# Patient Record
Sex: MALE | Race: WHITE | NOT HISPANIC OR LATINO
[De-identification: names, ages, dates, MRNs, and addresses within clinical notes are randomized per-mention and may not be internally consistent; named-entity substitution may affect disease eponyms.]

---

## 2017-01-10 ENCOUNTER — APPOINTMENT (OUTPATIENT)
Dept: NEPHROLOGY | Facility: CLINIC | Age: 67
End: 2017-01-10

## 2017-01-10 VITALS
HEIGHT: 78 IN | DIASTOLIC BLOOD PRESSURE: 75 MMHG | WEIGHT: 227.07 LBS | HEART RATE: 77 BPM | OXYGEN SATURATION: 98 % | SYSTOLIC BLOOD PRESSURE: 115 MMHG | BODY MASS INDEX: 26.27 KG/M2

## 2017-01-10 VITALS — DIASTOLIC BLOOD PRESSURE: 72 MMHG | SYSTOLIC BLOOD PRESSURE: 120 MMHG

## 2017-01-19 LAB
APPEARANCE: CLEAR
BACTERIA: NEGATIVE
BILIRUBIN URINE: NEGATIVE
BLOOD URINE: NEGATIVE
CALCIUM ?TM UR-MCNC: 6 MG/DL
CALCIUM/CREAT UR: 0.1 RATIO
COLOR: ABNORMAL
CREAT SPEC-SCNC: 83 MG/DL
CREAT SPEC-SCNC: 84 MG/DL
CREAT/PROT UR: 0.1 RATIO
GLUCOSE QUALITATIVE U: NORMAL MG/DL
HYALINE CASTS: 1 /LPF
KETONES URINE: NEGATIVE
LEUKOCYTE ESTERASE URINE: NEGATIVE
MICROSCOPIC-UA: NORMAL
NITRITE URINE: NEGATIVE
OSMOLALITY UR: 493 MOS/KG
PH URINE: 6
PROT UR-MCNC: 5 MG/DL
PROTEIN URINE: NEGATIVE MG/DL
RED BLOOD CELLS URINE: 3 /HPF
SODIUM ?TM SUB UR QN: 94 MMOL/L
SPECIFIC GRAVITY URINE: 1.02
SQUAMOUS EPITHELIAL CELLS: 0 /HPF
UROBILINOGEN URINE: NORMAL MG/DL
WHITE BLOOD CELLS URINE: 1 /HPF

## 2017-01-23 LAB — CYSTINE UR-MCNC: ABNORMAL

## 2017-02-10 ENCOUNTER — RX RENEWAL (OUTPATIENT)
Age: 67
End: 2017-02-10

## 2017-08-17 ENCOUNTER — APPOINTMENT (OUTPATIENT)
Dept: CARDIOLOGY | Facility: CLINIC | Age: 67
End: 2017-08-17
Payer: MEDICARE

## 2017-08-17 ENCOUNTER — NON-APPOINTMENT (OUTPATIENT)
Age: 67
End: 2017-08-17

## 2017-08-17 VITALS — HEART RATE: 72 BPM | DIASTOLIC BLOOD PRESSURE: 78 MMHG | SYSTOLIC BLOOD PRESSURE: 126 MMHG

## 2017-08-17 VITALS
HEIGHT: 78 IN | RESPIRATION RATE: 15 BRPM | WEIGHT: 225 LBS | DIASTOLIC BLOOD PRESSURE: 80 MMHG | HEART RATE: 65 BPM | SYSTOLIC BLOOD PRESSURE: 143 MMHG | OXYGEN SATURATION: 99 % | BODY MASS INDEX: 26.03 KG/M2

## 2017-08-17 DIAGNOSIS — R60.0 LOCALIZED EDEMA: ICD-10-CM

## 2017-08-17 PROCEDURE — 93306 TTE W/DOPPLER COMPLETE: CPT

## 2017-08-17 PROCEDURE — 93000 ELECTROCARDIOGRAM COMPLETE: CPT

## 2017-08-17 PROCEDURE — 99204 OFFICE O/P NEW MOD 45 MIN: CPT

## 2017-08-30 ENCOUNTER — APPOINTMENT (OUTPATIENT)
Dept: CARDIOLOGY | Facility: CLINIC | Age: 67
End: 2017-08-30
Payer: MEDICARE

## 2017-08-30 PROCEDURE — 93015 CV STRESS TEST SUPVJ I&R: CPT

## 2017-09-19 ENCOUNTER — FORM ENCOUNTER (OUTPATIENT)
Age: 67
End: 2017-09-19

## 2017-09-20 ENCOUNTER — OUTPATIENT (OUTPATIENT)
Dept: OUTPATIENT SERVICES | Facility: HOSPITAL | Age: 67
LOS: 1 days | End: 2017-09-20
Payer: MEDICARE

## 2017-09-20 ENCOUNTER — APPOINTMENT (OUTPATIENT)
Dept: CARDIOLOGY | Facility: CLINIC | Age: 67
End: 2017-09-20

## 2017-09-20 DIAGNOSIS — R07.9 CHEST PAIN, UNSPECIFIED: ICD-10-CM

## 2017-09-20 DIAGNOSIS — R94.39 ABNORMAL RESULT OF OTHER CARDIOVASCULAR FUNCTION STUDY: ICD-10-CM

## 2017-09-20 DIAGNOSIS — R06.00 DYSPNEA, UNSPECIFIED: ICD-10-CM

## 2017-09-20 PROCEDURE — 75574 CT ANGIO HRT W/3D IMAGE: CPT

## 2017-09-20 PROCEDURE — 75574 CT ANGIO HRT W/3D IMAGE: CPT | Mod: 26

## 2017-10-18 ENCOUNTER — APPOINTMENT (OUTPATIENT)
Dept: NEPHROLOGY | Facility: CLINIC | Age: 67
End: 2017-10-18
Payer: MEDICARE

## 2017-10-18 VITALS
OXYGEN SATURATION: 97 % | WEIGHT: 231.48 LBS | SYSTOLIC BLOOD PRESSURE: 128 MMHG | BODY MASS INDEX: 26.78 KG/M2 | HEART RATE: 79 BPM | HEIGHT: 78 IN | DIASTOLIC BLOOD PRESSURE: 77 MMHG

## 2017-10-18 VITALS — DIASTOLIC BLOOD PRESSURE: 76 MMHG | SYSTOLIC BLOOD PRESSURE: 128 MMHG

## 2017-10-18 PROCEDURE — 99214 OFFICE O/P EST MOD 30 MIN: CPT

## 2018-06-05 ENCOUNTER — APPOINTMENT (OUTPATIENT)
Dept: NEPHROLOGY | Facility: CLINIC | Age: 68
End: 2018-06-05
Payer: MEDICARE

## 2018-06-05 VITALS
HEART RATE: 100 BPM | OXYGEN SATURATION: 95 % | DIASTOLIC BLOOD PRESSURE: 81 MMHG | BODY MASS INDEX: 26.91 KG/M2 | SYSTOLIC BLOOD PRESSURE: 132 MMHG | WEIGHT: 232.58 LBS | HEIGHT: 78 IN

## 2018-06-05 VITALS — DIASTOLIC BLOOD PRESSURE: 78 MMHG | SYSTOLIC BLOOD PRESSURE: 130 MMHG

## 2018-06-05 PROCEDURE — 99214 OFFICE O/P EST MOD 30 MIN: CPT

## 2018-06-06 LAB
APPEARANCE: CLEAR
BACTERIA: NEGATIVE
BILIRUBIN URINE: NEGATIVE
BLOOD URINE: NEGATIVE
CALCIUM ?TM UR-MCNC: 19 MG/DL
CALCIUM/CREAT UR: 0.1 RATIO
COLOR: ABNORMAL
CREAT SPEC-SCNC: 141 MG/DL
CREAT/PROT UR: 0.1 RATIO
GLUCOSE QUALITATIVE U: NEGATIVE MG/DL
HYALINE CASTS: 0 /LPF
KETONES URINE: NEGATIVE
LEUKOCYTE ESTERASE URINE: NEGATIVE
MICROALBUMIN 24H UR DL<=1MG/L-MCNC: 1.2 MG/DL
MICROALBUMIN/CREAT 24H UR-RTO: 9 MG/G
MICROSCOPIC-UA: NORMAL
NITRITE URINE: NEGATIVE
OSMOLALITY UR: 834 MOS/KG
PH URINE: 5.5
PROT UR-MCNC: 10 MG/DL
PROTEIN URINE: NEGATIVE MG/DL
RED BLOOD CELLS URINE: 4 /HPF
SODIUM ?TM SUB UR QN: 172 MMOL/L
SPECIFIC GRAVITY URINE: 1.02
SQUAMOUS EPITHELIAL CELLS: 1 /HPF
UROBILINOGEN URINE: NEGATIVE MG/DL
WHITE BLOOD CELLS URINE: 1 /HPF

## 2018-06-07 LAB — BACTERIA UR CULT: NORMAL

## 2018-06-12 ENCOUNTER — OUTPATIENT (OUTPATIENT)
Dept: OUTPATIENT SERVICES | Facility: HOSPITAL | Age: 68
LOS: 1 days | End: 2018-06-12
Payer: MEDICARE

## 2018-06-12 ENCOUNTER — APPOINTMENT (OUTPATIENT)
Dept: RADIOLOGY | Facility: HOSPITAL | Age: 68
End: 2018-06-12
Payer: MEDICARE

## 2018-06-12 DIAGNOSIS — Z00.8 ENCOUNTER FOR OTHER GENERAL EXAMINATION: ICD-10-CM

## 2018-06-12 PROCEDURE — 74018 RADEX ABDOMEN 1 VIEW: CPT

## 2018-06-12 PROCEDURE — 74018 RADEX ABDOMEN 1 VIEW: CPT | Mod: 26

## 2018-06-14 ENCOUNTER — EMERGENCY (EMERGENCY)
Facility: HOSPITAL | Age: 68
LOS: 1 days | Discharge: HOME | End: 2018-06-14
Admitting: EMERGENCY MEDICINE
Payer: MEDICARE

## 2018-06-14 PROCEDURE — 99284 EMERGENCY DEPT VISIT MOD MDM: CPT

## 2018-06-14 PROCEDURE — 74176 CT ABD & PELVIS W/O CONTRAST: CPT | Mod: 26

## 2018-06-15 PROCEDURE — 87086 URINE CULTURE/COLONY COUNT: CPT

## 2018-06-15 PROCEDURE — 80048 BASIC METABOLIC PNL TOTAL CA: CPT

## 2018-06-15 PROCEDURE — 85610 PROTHROMBIN TIME: CPT

## 2018-06-15 PROCEDURE — 85730 THROMBOPLASTIN TIME PARTIAL: CPT

## 2018-06-15 PROCEDURE — 81003 URINALYSIS AUTO W/O SCOPE: CPT

## 2018-06-15 PROCEDURE — 96375 TX/PRO/DX INJ NEW DRUG ADDON: CPT

## 2018-06-15 PROCEDURE — 74176 CT ABD & PELVIS W/O CONTRAST: CPT

## 2018-06-15 PROCEDURE — 96374 THER/PROPH/DIAG INJ IV PUSH: CPT

## 2018-06-15 PROCEDURE — 99284 EMERGENCY DEPT VISIT MOD MDM: CPT | Mod: 25

## 2018-06-15 PROCEDURE — 85027 COMPLETE CBC AUTOMATED: CPT

## 2018-06-18 ENCOUNTER — OUTPATIENT (OUTPATIENT)
Dept: OUTPATIENT SERVICES | Facility: HOSPITAL | Age: 68
LOS: 1 days | End: 2018-06-18
Payer: MEDICARE

## 2018-06-18 ENCOUNTER — APPOINTMENT (OUTPATIENT)
Dept: RADIOLOGY | Facility: HOSPITAL | Age: 68
End: 2018-06-18
Payer: MEDICARE

## 2018-06-18 DIAGNOSIS — Z00.8 ENCOUNTER FOR OTHER GENERAL EXAMINATION: ICD-10-CM

## 2018-06-18 LAB — CYSTINE UR-MCNC: ABNORMAL

## 2018-06-18 PROCEDURE — 74018 RADEX ABDOMEN 1 VIEW: CPT | Mod: 26

## 2018-06-18 PROCEDURE — 74018 RADEX ABDOMEN 1 VIEW: CPT

## 2018-06-26 ENCOUNTER — OUTPATIENT (OUTPATIENT)
Dept: OUTPATIENT SERVICES | Facility: HOSPITAL | Age: 68
LOS: 1 days | End: 2018-06-26
Payer: MEDICARE

## 2018-06-26 VITALS
HEART RATE: 80 BPM | HEIGHT: 76 IN | TEMPERATURE: 98 F | RESPIRATION RATE: 16 BRPM | SYSTOLIC BLOOD PRESSURE: 131 MMHG | DIASTOLIC BLOOD PRESSURE: 79 MMHG | WEIGHT: 225.75 LBS | OXYGEN SATURATION: 96 %

## 2018-06-26 VITALS
SYSTOLIC BLOOD PRESSURE: 121 MMHG | RESPIRATION RATE: 16 BRPM | TEMPERATURE: 98 F | DIASTOLIC BLOOD PRESSURE: 83 MMHG | HEIGHT: 76 IN | HEART RATE: 93 BPM | WEIGHT: 225.75 LBS

## 2018-06-26 DIAGNOSIS — Z98.890 OTHER SPECIFIED POSTPROCEDURAL STATES: Chronic | ICD-10-CM

## 2018-06-26 DIAGNOSIS — Z01.818 ENCOUNTER FOR OTHER PREPROCEDURAL EXAMINATION: ICD-10-CM

## 2018-06-26 DIAGNOSIS — N20.0 CALCULUS OF KIDNEY: ICD-10-CM

## 2018-06-26 DIAGNOSIS — N20.1 CALCULUS OF URETER: ICD-10-CM

## 2018-06-26 DIAGNOSIS — N21.0 CALCULUS IN BLADDER: ICD-10-CM

## 2018-06-26 DIAGNOSIS — Z90.89 ACQUIRED ABSENCE OF OTHER ORGANS: Chronic | ICD-10-CM

## 2018-06-26 LAB
ANION GAP SERPL CALC-SCNC: 9 MMOL/L — SIGNIFICANT CHANGE UP (ref 5–17)
BUN SERPL-MCNC: 15 MG/DL — SIGNIFICANT CHANGE UP (ref 7–23)
CALCIUM SERPL-MCNC: 9.3 MG/DL — SIGNIFICANT CHANGE UP (ref 8.4–10.5)
CHLORIDE SERPL-SCNC: 110 MMOL/L — HIGH (ref 96–108)
CO2 SERPL-SCNC: 26 MMOL/L — SIGNIFICANT CHANGE UP (ref 22–31)
CREAT SERPL-MCNC: 0.95 MG/DL — SIGNIFICANT CHANGE UP (ref 0.5–1.3)
GLUCOSE SERPL-MCNC: 116 MG/DL — HIGH (ref 70–99)
HCT VFR BLD CALC: 46.8 % — SIGNIFICANT CHANGE UP (ref 39–50)
HGB BLD-MCNC: 16.3 G/DL — SIGNIFICANT CHANGE UP (ref 13–17)
MCHC RBC-ENTMCNC: 32.8 PG — SIGNIFICANT CHANGE UP (ref 27–34)
MCHC RBC-ENTMCNC: 34.9 GM/DL — SIGNIFICANT CHANGE UP (ref 32–36)
MCV RBC AUTO: 94 FL — SIGNIFICANT CHANGE UP (ref 80–100)
PLATELET # BLD AUTO: 248 K/UL — SIGNIFICANT CHANGE UP (ref 150–400)
POTASSIUM SERPL-MCNC: 3.7 MMOL/L — SIGNIFICANT CHANGE UP (ref 3.5–5.3)
POTASSIUM SERPL-SCNC: 3.7 MMOL/L — SIGNIFICANT CHANGE UP (ref 3.5–5.3)
RBC # BLD: 4.98 M/UL — SIGNIFICANT CHANGE UP (ref 4.2–5.8)
RBC # FLD: 11.3 % — SIGNIFICANT CHANGE UP (ref 10.3–14.5)
SODIUM SERPL-SCNC: 145 MMOL/L — SIGNIFICANT CHANGE UP (ref 135–145)
WBC # BLD: 7.8 K/UL — SIGNIFICANT CHANGE UP (ref 3.8–10.5)
WBC # FLD AUTO: 7.8 K/UL — SIGNIFICANT CHANGE UP (ref 3.8–10.5)

## 2018-06-26 PROCEDURE — 93010 ELECTROCARDIOGRAM REPORT: CPT | Mod: NC

## 2018-06-26 PROCEDURE — G0463: CPT

## 2018-06-26 PROCEDURE — 36415 COLL VENOUS BLD VENIPUNCTURE: CPT

## 2018-06-26 PROCEDURE — 85027 COMPLETE CBC AUTOMATED: CPT

## 2018-06-26 PROCEDURE — 80048 BASIC METABOLIC PNL TOTAL CA: CPT

## 2018-06-26 PROCEDURE — 93005 ELECTROCARDIOGRAM TRACING: CPT

## 2018-06-26 RX ORDER — SODIUM CHLORIDE 9 MG/ML
1000 INJECTION, SOLUTION INTRAVENOUS
Qty: 0 | Refills: 0 | Status: DISCONTINUED | OUTPATIENT
Start: 2018-07-02 | End: 2018-07-02

## 2018-06-26 NOTE — H&P PST ADULT - FAMILY HISTORY
Mother  Still living? No  Family history of cirrhosis of liver, Age at diagnosis: Age Unknown  Family history of Parkinson disease, Age at diagnosis: Age Unknown

## 2018-06-26 NOTE — H&P PST ADULT - RS GEN PE MLT RESP DETAILS PC
respirations non-labored/breath sounds equal/airway patent/good air movement/no rales/no wheezes/no rhonchi/clear to auscultation bilaterally

## 2018-06-26 NOTE — H&P PST ADULT - HISTORY OF PRESENT ILLNESS
68 yo male presents s/p acute episode of right flank pain. Had an xray done which revealed 2 stones on the left but nothing on the right. He then was sent to the ER at [facility] and had an ultrasound done which showed stones on the right and a stone in the bladder. Currently denies pain and the past episode of pain was on 6/21/18

## 2018-06-26 NOTE — ASU PATIENT PROFILE, ADULT - PMH
BPH (benign prostatic hyperplasia)  no symptoms  Heart murmur    Lumbar disc herniation  unsure level  Osteoarthritis    Renal calculi  multiple episodes of bladder and kidney stones (cysteine stones)  Varicose veins  bilateral

## 2018-06-26 NOTE — H&P PST ADULT - PSH
H/O lithotripsy  x4  S/P colonoscopy  2018, no polyps  S/P cystoscopy  2015 bladder stone extraction  S/P T&A (status post tonsillectomy and adenoidectomy)  as child

## 2018-06-26 NOTE — H&P PST ADULT - PMH
BPH (benign prostatic hyperplasia)  no symptoms  Heart murmur    Lumbar disc herniation  unsure level  Osteoarthritis    Renal calculi  multiple episodes of bladder and kidney stones (cysteine stones) BPH (benign prostatic hyperplasia)  no symptoms  Heart murmur    Lumbar disc herniation  unsure level  Osteoarthritis    Renal calculi  multiple episodes of bladder and kidney stones (cysteine stones)  Varicose veins  bilateral

## 2018-06-26 NOTE — H&P PST ADULT - PROBLEM SELECTOR PLAN 1
cystoscopy, litholapaxy bladder stone, right ureteroscopy, laser litho, right stent placement. medical clearance requested. pantoprazole AM of surgery with sips of water

## 2018-06-28 ENCOUNTER — APPOINTMENT (OUTPATIENT)
Dept: CARDIOLOGY | Facility: CLINIC | Age: 68
End: 2018-06-28
Payer: MEDICARE

## 2018-06-28 ENCOUNTER — NON-APPOINTMENT (OUTPATIENT)
Age: 68
End: 2018-06-28

## 2018-06-28 VITALS
RESPIRATION RATE: 15 BRPM | HEART RATE: 75 BPM | OXYGEN SATURATION: 98 % | DIASTOLIC BLOOD PRESSURE: 80 MMHG | SYSTOLIC BLOOD PRESSURE: 127 MMHG | HEIGHT: 78 IN

## 2018-06-28 VITALS — DIASTOLIC BLOOD PRESSURE: 76 MMHG | HEART RATE: 80 BPM | SYSTOLIC BLOOD PRESSURE: 120 MMHG

## 2018-06-28 DIAGNOSIS — R94.39 ABNORMAL RESULT OF OTHER CARDIOVASCULAR FUNCTION STUDY: ICD-10-CM

## 2018-06-28 PROCEDURE — 93000 ELECTROCARDIOGRAM COMPLETE: CPT

## 2018-06-28 PROCEDURE — 99214 OFFICE O/P EST MOD 30 MIN: CPT

## 2018-06-28 RX ORDER — CIPROFLOXACIN HYDROCHLORIDE 500 MG/1
500 TABLET, FILM COATED ORAL
Qty: 20 | Refills: 0 | Status: DISCONTINUED | COMMUNITY
Start: 2018-06-14

## 2018-06-28 RX ORDER — PENICILLAMINE 250 MG/1
250 CAPSULE ORAL
Qty: 90 | Refills: 3 | Status: DISCONTINUED | COMMUNITY
Start: 2017-10-18 | End: 2018-06-28

## 2018-07-01 ENCOUNTER — TRANSCRIPTION ENCOUNTER (OUTPATIENT)
Age: 68
End: 2018-07-01

## 2018-07-02 ENCOUNTER — RESULT REVIEW (OUTPATIENT)
Age: 68
End: 2018-07-02

## 2018-07-02 ENCOUNTER — OUTPATIENT (OUTPATIENT)
Dept: OUTPATIENT SERVICES | Facility: HOSPITAL | Age: 68
LOS: 1 days | End: 2018-07-02
Payer: MEDICARE

## 2018-07-02 VITALS
SYSTOLIC BLOOD PRESSURE: 131 MMHG | DIASTOLIC BLOOD PRESSURE: 75 MMHG | HEART RATE: 87 BPM | TEMPERATURE: 98 F | OXYGEN SATURATION: 96 % | RESPIRATION RATE: 16 BRPM

## 2018-07-02 DIAGNOSIS — Z98.890 OTHER SPECIFIED POSTPROCEDURAL STATES: Chronic | ICD-10-CM

## 2018-07-02 DIAGNOSIS — N20.1 CALCULUS OF URETER: ICD-10-CM

## 2018-07-02 DIAGNOSIS — N21.0 CALCULUS IN BLADDER: ICD-10-CM

## 2018-07-02 DIAGNOSIS — Z90.89 ACQUIRED ABSENCE OF OTHER ORGANS: Chronic | ICD-10-CM

## 2018-07-02 PROCEDURE — 88300 SURGICAL PATH GROSS: CPT

## 2018-07-02 PROCEDURE — C2617: CPT

## 2018-07-02 PROCEDURE — C1769: CPT

## 2018-07-02 PROCEDURE — C1758: CPT

## 2018-07-02 PROCEDURE — 82365 CALCULUS SPECTROSCOPY: CPT

## 2018-07-02 PROCEDURE — 88300 SURGICAL PATH GROSS: CPT | Mod: 26

## 2018-07-02 PROCEDURE — 52356 CYSTO/URETERO W/LITHOTRIPSY: CPT | Mod: RT

## 2018-07-02 PROCEDURE — 52317 REMOVE BLADDER STONE: CPT | Mod: XS

## 2018-07-02 PROCEDURE — 76000 FLUOROSCOPY <1 HR PHYS/QHP: CPT

## 2018-07-02 PROCEDURE — C1889: CPT

## 2018-07-02 RX ORDER — ONDANSETRON 8 MG/1
4 TABLET, FILM COATED ORAL ONCE
Qty: 0 | Refills: 0 | Status: DISCONTINUED | OUTPATIENT
Start: 2018-07-02 | End: 2018-07-02

## 2018-07-02 RX ORDER — HYDROMORPHONE HYDROCHLORIDE 2 MG/ML
0.5 INJECTION INTRAMUSCULAR; INTRAVENOUS; SUBCUTANEOUS
Qty: 0 | Refills: 0 | Status: DISCONTINUED | OUTPATIENT
Start: 2018-07-02 | End: 2018-07-02

## 2018-07-02 RX ORDER — AZTREONAM 2 G
1 VIAL (EA) INJECTION
Qty: 10 | Refills: 0 | OUTPATIENT
Start: 2018-07-02 | End: 2018-07-06

## 2018-07-02 RX ORDER — SODIUM CHLORIDE 9 MG/ML
1000 INJECTION, SOLUTION INTRAVENOUS
Qty: 0 | Refills: 0 | Status: DISCONTINUED | OUTPATIENT
Start: 2018-07-02 | End: 2018-07-02

## 2018-07-02 RX ADMIN — SODIUM CHLORIDE 112 MILLILITER(S): 9 INJECTION, SOLUTION INTRAVENOUS at 09:36

## 2018-07-02 RX ADMIN — SODIUM CHLORIDE 50 MILLILITER(S): 9 INJECTION, SOLUTION INTRAVENOUS at 07:04

## 2018-07-02 NOTE — BRIEF OPERATIVE NOTE - POST-OP DX
Bladder stone  07/02/2018    Active  Mohit Moya  Ureteral stone with hydronephrosis  07/02/2018    Active  Mohit Moya

## 2018-07-02 NOTE — BRIEF OPERATIVE NOTE - PROCEDURE
<<-----Click on this checkbox to enter Procedure Ureteral stent placement, intraoperatively (not at tx)  07/02/2018    Active  MALYSKEW  Ureteroscopy of right ureter with lithotripsy  07/02/2018    Active  MALYSKEW  Litholapaxy of calculus less than 2.5 cm  07/02/2018    Active  MALYSKEW

## 2018-07-02 NOTE — BRIEF OPERATIVE NOTE - PRE-OP DX
Bladder stone  07/02/2018    Active  Mohit Moya  Ureteral stone with hydronephrosis  07/02/2018    Active  Mohit oMya

## 2018-07-02 NOTE — ASU DISCHARGE PLAN (ADULT/PEDIATRIC). - NURSING INSTRUCTIONS
Pt discharged with carbone  connected to leg bag.   Pt instructed on care and emptying of carbone.  Pt encouraged to drink increased fluids

## 2018-07-05 LAB
COMPN STONE: SIGNIFICANT CHANGE UP
SURGICAL PATHOLOGY STUDY: SIGNIFICANT CHANGE UP

## 2018-07-23 ENCOUNTER — OUTPATIENT (OUTPATIENT)
Dept: OUTPATIENT SERVICES | Facility: HOSPITAL | Age: 68
LOS: 1 days | End: 2018-07-23
Payer: MEDICARE

## 2018-07-23 ENCOUNTER — APPOINTMENT (OUTPATIENT)
Dept: RADIOLOGY | Facility: HOSPITAL | Age: 68
End: 2018-07-23
Payer: MEDICARE

## 2018-07-23 DIAGNOSIS — Z98.890 OTHER SPECIFIED POSTPROCEDURAL STATES: Chronic | ICD-10-CM

## 2018-07-23 DIAGNOSIS — Z90.89 ACQUIRED ABSENCE OF OTHER ORGANS: Chronic | ICD-10-CM

## 2018-07-23 DIAGNOSIS — Z00.8 ENCOUNTER FOR OTHER GENERAL EXAMINATION: ICD-10-CM

## 2018-07-23 PROBLEM — R01.1 CARDIAC MURMUR, UNSPECIFIED: Chronic | Status: ACTIVE | Noted: 2018-06-26

## 2018-07-23 PROBLEM — M51.26 OTHER INTERVERTEBRAL DISC DISPLACEMENT, LUMBAR REGION: Chronic | Status: ACTIVE | Noted: 2018-06-26

## 2018-07-23 PROBLEM — N20.0 CALCULUS OF KIDNEY: Chronic | Status: ACTIVE | Noted: 2018-06-26

## 2018-07-23 PROBLEM — I83.90 ASYMPTOMATIC VARICOSE VEINS OF UNSPECIFIED LOWER EXTREMITY: Chronic | Status: ACTIVE | Noted: 2018-06-26

## 2018-07-23 PROBLEM — N40.0 BENIGN PROSTATIC HYPERPLASIA WITHOUT LOWER URINARY TRACT SYMPTOMS: Chronic | Status: ACTIVE | Noted: 2018-06-26

## 2018-07-23 PROBLEM — M19.90 UNSPECIFIED OSTEOARTHRITIS, UNSPECIFIED SITE: Chronic | Status: ACTIVE | Noted: 2018-06-26

## 2018-07-23 PROCEDURE — 74018 RADEX ABDOMEN 1 VIEW: CPT

## 2018-07-23 PROCEDURE — 74018 RADEX ABDOMEN 1 VIEW: CPT | Mod: 26

## 2018-10-05 ENCOUNTER — APPOINTMENT (OUTPATIENT)
Dept: RADIOLOGY | Facility: HOSPITAL | Age: 68
End: 2018-10-05
Payer: MEDICARE

## 2018-10-05 ENCOUNTER — OUTPATIENT (OUTPATIENT)
Dept: OUTPATIENT SERVICES | Facility: HOSPITAL | Age: 68
LOS: 1 days | End: 2018-10-05
Payer: MEDICARE

## 2018-10-05 ENCOUNTER — APPOINTMENT (OUTPATIENT)
Dept: ULTRASOUND IMAGING | Facility: HOSPITAL | Age: 68
End: 2018-10-05

## 2018-10-05 DIAGNOSIS — Z98.890 OTHER SPECIFIED POSTPROCEDURAL STATES: Chronic | ICD-10-CM

## 2018-10-05 DIAGNOSIS — Z90.89 ACQUIRED ABSENCE OF OTHER ORGANS: Chronic | ICD-10-CM

## 2018-10-05 DIAGNOSIS — Z00.8 ENCOUNTER FOR OTHER GENERAL EXAMINATION: ICD-10-CM

## 2018-10-05 PROCEDURE — 76770 US EXAM ABDO BACK WALL COMP: CPT | Mod: 26

## 2018-10-05 PROCEDURE — 74018 RADEX ABDOMEN 1 VIEW: CPT | Mod: 26

## 2018-10-05 PROCEDURE — 76770 US EXAM ABDO BACK WALL COMP: CPT

## 2018-10-05 PROCEDURE — 74018 RADEX ABDOMEN 1 VIEW: CPT

## 2018-11-26 ENCOUNTER — APPOINTMENT (OUTPATIENT)
Dept: SURGERY | Facility: CLINIC | Age: 68
End: 2018-11-26
Payer: MEDICARE

## 2018-11-26 VITALS — HEIGHT: 78 IN | WEIGHT: 232 LBS | BODY MASS INDEX: 26.84 KG/M2

## 2018-11-26 DIAGNOSIS — Z86.010 ENCOUNTER FOR SCREENING FOR MALIGNANT NEOPLASM OF COLON: ICD-10-CM

## 2018-11-26 DIAGNOSIS — Z12.11 ENCOUNTER FOR SCREENING FOR MALIGNANT NEOPLASM OF COLON: ICD-10-CM

## 2018-11-26 PROCEDURE — 99214 OFFICE O/P EST MOD 30 MIN: CPT

## 2018-11-29 ENCOUNTER — OUTPATIENT (OUTPATIENT)
Dept: OUTPATIENT SERVICES | Facility: HOSPITAL | Age: 68
LOS: 1 days | End: 2018-11-29
Payer: MEDICARE

## 2018-11-29 ENCOUNTER — APPOINTMENT (OUTPATIENT)
Dept: RADIOLOGY | Facility: HOSPITAL | Age: 68
End: 2018-11-29

## 2018-11-29 DIAGNOSIS — Z98.890 OTHER SPECIFIED POSTPROCEDURAL STATES: Chronic | ICD-10-CM

## 2018-11-29 DIAGNOSIS — Z90.89 ACQUIRED ABSENCE OF OTHER ORGANS: Chronic | ICD-10-CM

## 2018-11-29 DIAGNOSIS — Z00.8 ENCOUNTER FOR OTHER GENERAL EXAMINATION: ICD-10-CM

## 2018-11-29 PROCEDURE — 73630 X-RAY EXAM OF FOOT: CPT

## 2018-11-29 PROCEDURE — 73630 X-RAY EXAM OF FOOT: CPT | Mod: 26,RT

## 2019-01-02 ENCOUNTER — APPOINTMENT (OUTPATIENT)
Dept: RADIOLOGY | Facility: HOSPITAL | Age: 69
End: 2019-01-02
Payer: MEDICARE

## 2019-01-02 ENCOUNTER — OUTPATIENT (OUTPATIENT)
Dept: OUTPATIENT SERVICES | Facility: HOSPITAL | Age: 69
LOS: 1 days | End: 2019-01-02
Payer: MEDICARE

## 2019-01-02 DIAGNOSIS — Z90.89 ACQUIRED ABSENCE OF OTHER ORGANS: Chronic | ICD-10-CM

## 2019-01-02 DIAGNOSIS — Z98.890 OTHER SPECIFIED POSTPROCEDURAL STATES: Chronic | ICD-10-CM

## 2019-01-02 DIAGNOSIS — Z00.8 ENCOUNTER FOR OTHER GENERAL EXAMINATION: ICD-10-CM

## 2019-01-02 PROCEDURE — 73630 X-RAY EXAM OF FOOT: CPT

## 2019-01-02 PROCEDURE — 73630 X-RAY EXAM OF FOOT: CPT | Mod: 26,LT

## 2019-01-14 ENCOUNTER — TRANSCRIPTION ENCOUNTER (OUTPATIENT)
Age: 69
End: 2019-01-14

## 2019-01-15 ENCOUNTER — OUTPATIENT (OUTPATIENT)
Dept: OUTPATIENT SERVICES | Facility: HOSPITAL | Age: 69
LOS: 1 days | End: 2019-01-15
Payer: MEDICARE

## 2019-01-15 ENCOUNTER — RESULT REVIEW (OUTPATIENT)
Age: 69
End: 2019-01-15

## 2019-01-15 DIAGNOSIS — Z98.890 OTHER SPECIFIED POSTPROCEDURAL STATES: Chronic | ICD-10-CM

## 2019-01-15 DIAGNOSIS — K57.30 DIVERTICULOSIS OF LARGE INTESTINE WITHOUT PERFORATION OR ABSCESS WITHOUT BLEEDING: ICD-10-CM

## 2019-01-15 DIAGNOSIS — Z86.010 PERSONAL HISTORY OF COLONIC POLYPS: ICD-10-CM

## 2019-01-15 DIAGNOSIS — Z90.89 ACQUIRED ABSENCE OF OTHER ORGANS: Chronic | ICD-10-CM

## 2019-01-15 PROCEDURE — 45385 COLONOSCOPY W/LESION REMOVAL: CPT

## 2019-01-15 PROCEDURE — 88305 TISSUE EXAM BY PATHOLOGIST: CPT

## 2019-01-15 PROCEDURE — 88305 TISSUE EXAM BY PATHOLOGIST: CPT | Mod: 26

## 2019-01-15 RX ORDER — SODIUM CHLORIDE 9 MG/ML
1000 INJECTION INTRAMUSCULAR; INTRAVENOUS; SUBCUTANEOUS
Qty: 0 | Refills: 0 | Status: DISCONTINUED | OUTPATIENT
Start: 2019-01-15 | End: 2019-01-30

## 2019-01-15 RX ADMIN — SODIUM CHLORIDE 75 MILLILITER(S): 9 INJECTION INTRAMUSCULAR; INTRAVENOUS; SUBCUTANEOUS at 08:03

## 2019-01-16 LAB — SURGICAL PATHOLOGY STUDY: SIGNIFICANT CHANGE UP

## 2019-02-01 ENCOUNTER — APPOINTMENT (OUTPATIENT)
Dept: RADIOLOGY | Facility: HOSPITAL | Age: 69
End: 2019-02-01
Payer: MEDICARE

## 2019-02-01 ENCOUNTER — OUTPATIENT (OUTPATIENT)
Dept: OUTPATIENT SERVICES | Facility: HOSPITAL | Age: 69
LOS: 1 days | End: 2019-02-01
Payer: MEDICARE

## 2019-02-01 ENCOUNTER — APPOINTMENT (OUTPATIENT)
Dept: ULTRASOUND IMAGING | Facility: HOSPITAL | Age: 69
End: 2019-02-01
Payer: MEDICARE

## 2019-02-01 DIAGNOSIS — Z98.890 OTHER SPECIFIED POSTPROCEDURAL STATES: Chronic | ICD-10-CM

## 2019-02-01 DIAGNOSIS — Z00.8 ENCOUNTER FOR OTHER GENERAL EXAMINATION: ICD-10-CM

## 2019-02-01 DIAGNOSIS — Z90.89 ACQUIRED ABSENCE OF OTHER ORGANS: Chronic | ICD-10-CM

## 2019-02-01 PROCEDURE — 76770 US EXAM ABDO BACK WALL COMP: CPT | Mod: 26

## 2019-02-01 PROCEDURE — 74018 RADEX ABDOMEN 1 VIEW: CPT

## 2019-02-01 PROCEDURE — 76770 US EXAM ABDO BACK WALL COMP: CPT

## 2019-02-01 PROCEDURE — 74018 RADEX ABDOMEN 1 VIEW: CPT | Mod: 26

## 2019-02-06 ENCOUNTER — APPOINTMENT (OUTPATIENT)
Dept: NEPHROLOGY | Facility: CLINIC | Age: 69
End: 2019-02-06

## 2019-02-08 ENCOUNTER — OUTPATIENT (OUTPATIENT)
Dept: OUTPATIENT SERVICES | Facility: HOSPITAL | Age: 69
LOS: 1 days | End: 2019-02-08
Payer: MEDICARE

## 2019-02-08 ENCOUNTER — APPOINTMENT (OUTPATIENT)
Dept: CT IMAGING | Facility: HOSPITAL | Age: 69
End: 2019-02-08
Payer: MEDICARE

## 2019-02-08 DIAGNOSIS — Z98.890 OTHER SPECIFIED POSTPROCEDURAL STATES: Chronic | ICD-10-CM

## 2019-02-08 DIAGNOSIS — Z90.89 ACQUIRED ABSENCE OF OTHER ORGANS: Chronic | ICD-10-CM

## 2019-02-08 DIAGNOSIS — Z00.8 ENCOUNTER FOR OTHER GENERAL EXAMINATION: ICD-10-CM

## 2019-02-08 PROCEDURE — 74176 CT ABD & PELVIS W/O CONTRAST: CPT

## 2019-02-08 PROCEDURE — 74176 CT ABD & PELVIS W/O CONTRAST: CPT | Mod: 26

## 2019-02-25 ENCOUNTER — OUTPATIENT (OUTPATIENT)
Dept: OUTPATIENT SERVICES | Facility: HOSPITAL | Age: 69
LOS: 1 days | End: 2019-02-25
Payer: MEDICARE

## 2019-02-25 VITALS
HEART RATE: 78 BPM | HEIGHT: 76.5 IN | WEIGHT: 229.28 LBS | OXYGEN SATURATION: 98 % | SYSTOLIC BLOOD PRESSURE: 141 MMHG | TEMPERATURE: 98 F | RESPIRATION RATE: 18 BRPM | DIASTOLIC BLOOD PRESSURE: 85 MMHG

## 2019-02-25 VITALS — HEIGHT: 76.5 IN | WEIGHT: 229.28 LBS

## 2019-02-25 DIAGNOSIS — Z98.890 OTHER SPECIFIED POSTPROCEDURAL STATES: Chronic | ICD-10-CM

## 2019-02-25 DIAGNOSIS — N21.0 CALCULUS IN BLADDER: ICD-10-CM

## 2019-02-25 DIAGNOSIS — Z90.89 ACQUIRED ABSENCE OF OTHER ORGANS: Chronic | ICD-10-CM

## 2019-02-25 DIAGNOSIS — Z01.818 ENCOUNTER FOR OTHER PREPROCEDURAL EXAMINATION: ICD-10-CM

## 2019-02-25 LAB
ANION GAP SERPL CALC-SCNC: 10 MMOL/L — SIGNIFICANT CHANGE UP (ref 5–17)
APPEARANCE UR: CLEAR — SIGNIFICANT CHANGE UP
BACTERIA # UR AUTO: ABNORMAL /HPF
BILIRUB UR-MCNC: NEGATIVE — SIGNIFICANT CHANGE UP
BUN SERPL-MCNC: 22 MG/DL — SIGNIFICANT CHANGE UP (ref 7–23)
CALCIUM SERPL-MCNC: 9.6 MG/DL — SIGNIFICANT CHANGE UP (ref 8.4–10.5)
CHLORIDE SERPL-SCNC: 106 MMOL/L — SIGNIFICANT CHANGE UP (ref 96–108)
CO2 SERPL-SCNC: 25 MMOL/L — SIGNIFICANT CHANGE UP (ref 22–31)
COLOR SPEC: YELLOW — SIGNIFICANT CHANGE UP
CREAT SERPL-MCNC: 1.24 MG/DL — SIGNIFICANT CHANGE UP (ref 0.5–1.3)
DIFF PNL FLD: ABNORMAL
EPI CELLS # UR: SIGNIFICANT CHANGE UP
GLUCOSE SERPL-MCNC: 99 MG/DL — SIGNIFICANT CHANGE UP (ref 70–99)
GLUCOSE UR QL: NEGATIVE — SIGNIFICANT CHANGE UP
HCT VFR BLD CALC: 48.6 % — SIGNIFICANT CHANGE UP (ref 39–50)
HGB BLD-MCNC: 16.2 G/DL — SIGNIFICANT CHANGE UP (ref 13–17)
HYALINE CASTS # UR AUTO: ABNORMAL (ref 0–7)
KETONES UR-MCNC: NEGATIVE — SIGNIFICANT CHANGE UP
LEUKOCYTE ESTERASE UR-ACNC: NEGATIVE — SIGNIFICANT CHANGE UP
MCHC RBC-ENTMCNC: 32.3 PG — SIGNIFICANT CHANGE UP (ref 27–34)
MCHC RBC-ENTMCNC: 33.4 GM/DL — SIGNIFICANT CHANGE UP (ref 32–36)
MCV RBC AUTO: 96.6 FL — SIGNIFICANT CHANGE UP (ref 80–100)
NITRITE UR-MCNC: NEGATIVE — SIGNIFICANT CHANGE UP
PH UR: 5 — SIGNIFICANT CHANGE UP (ref 5–8)
PLATELET # BLD AUTO: 203 K/UL — SIGNIFICANT CHANGE UP (ref 150–400)
POTASSIUM SERPL-MCNC: 3.6 MMOL/L — SIGNIFICANT CHANGE UP (ref 3.5–5.3)
POTASSIUM SERPL-SCNC: 3.6 MMOL/L — SIGNIFICANT CHANGE UP (ref 3.5–5.3)
PROT UR-MCNC: NEGATIVE — SIGNIFICANT CHANGE UP
RBC # BLD: 5.02 M/UL — SIGNIFICANT CHANGE UP (ref 4.2–5.8)
RBC # FLD: 11.6 % — SIGNIFICANT CHANGE UP (ref 10.3–14.5)
RBC CASTS # UR COMP ASSIST: ABNORMAL /HPF (ref 0–4)
SODIUM SERPL-SCNC: 141 MMOL/L — SIGNIFICANT CHANGE UP (ref 135–145)
SP GR SPEC: 1.02 — SIGNIFICANT CHANGE UP (ref 1.01–1.02)
UROBILINOGEN FLD QL: NEGATIVE — SIGNIFICANT CHANGE UP
WBC # BLD: 6.3 K/UL — SIGNIFICANT CHANGE UP (ref 3.8–10.5)
WBC # FLD AUTO: 6.3 K/UL — SIGNIFICANT CHANGE UP (ref 3.8–10.5)
WBC UR QL: SIGNIFICANT CHANGE UP /HPF (ref 0–5)

## 2019-02-25 PROCEDURE — G0463: CPT

## 2019-02-25 PROCEDURE — 80048 BASIC METABOLIC PNL TOTAL CA: CPT

## 2019-02-25 PROCEDURE — 81001 URINALYSIS AUTO W/SCOPE: CPT

## 2019-02-25 PROCEDURE — 85027 COMPLETE CBC AUTOMATED: CPT

## 2019-02-25 PROCEDURE — 36415 COLL VENOUS BLD VENIPUNCTURE: CPT

## 2019-02-25 PROCEDURE — 87086 URINE CULTURE/COLONY COUNT: CPT

## 2019-02-25 PROCEDURE — 93005 ELECTROCARDIOGRAM TRACING: CPT

## 2019-02-25 PROCEDURE — 93010 ELECTROCARDIOGRAM REPORT: CPT | Mod: NC

## 2019-02-25 RX ORDER — SODIUM CHLORIDE 9 MG/ML
1000 INJECTION, SOLUTION INTRAVENOUS
Qty: 0 | Refills: 0 | Status: DISCONTINUED | OUTPATIENT
Start: 2019-03-01 | End: 2019-03-01

## 2019-02-25 NOTE — H&P PST ADULT - PMH
BPH (benign prostatic hyperplasia)  no symptoms  Calculus in bladder    GERD without esophagitis    Heart murmur    Lumbar disc herniation  unsure level  Osteoarthritis    Renal calculi  multiple episodes of bladder and kidney stones (cysteine stones)  Varicose veins  bilateral

## 2019-02-25 NOTE — H&P PST ADULT - NEGATIVE CARDIOVASCULAR SYMPTOMS
no palpitations/no claudication/no dyspnea on exertion/no orthopnea/no paroxysmal nocturnal dyspnea/no peripheral edema/no chest pain

## 2019-02-25 NOTE — H&P PST ADULT - PROBLEM SELECTOR PLAN 1
Patient schedule for cystoscopy, lithopaxy, lithotripsy and ureteral stent. Labs, ekg and medical clearance pending.

## 2019-02-25 NOTE — H&P PST ADULT - NSANTHOSAYNRD_GEN_A_CORE
No. MARCI screening performed.  STOP BANG Legend: 0-2 = LOW Risk; 3-4 = INTERMEDIATE Risk; 5-8 = HIGH Risk

## 2019-02-25 NOTE — H&P PST ADULT - HISTORY OF PRESENT ILLNESS
69 y/o male presents for PST. As per patient he has hx of renal calculi and calculus of the bladder since 2002. As per patient during recent routine ct-scan calculus of bladder and ureters was noted.

## 2019-02-25 NOTE — H&P PST ADULT - RS GEN PE MLT RESP DETAILS PC
normal/breath sounds equal/good air movement/clear to auscultation bilaterally/respirations non-labored/airway patent/no chest wall tenderness

## 2019-02-26 LAB
CULTURE RESULTS: SIGNIFICANT CHANGE UP
SPECIMEN SOURCE: SIGNIFICANT CHANGE UP

## 2019-02-28 ENCOUNTER — TRANSCRIPTION ENCOUNTER (OUTPATIENT)
Age: 69
End: 2019-02-28

## 2019-03-01 ENCOUNTER — RESULT REVIEW (OUTPATIENT)
Age: 69
End: 2019-03-01

## 2019-03-01 ENCOUNTER — OUTPATIENT (OUTPATIENT)
Dept: OUTPATIENT SERVICES | Facility: HOSPITAL | Age: 69
LOS: 1 days | End: 2019-03-01
Payer: MEDICARE

## 2019-03-01 VITALS
RESPIRATION RATE: 14 BRPM | SYSTOLIC BLOOD PRESSURE: 129 MMHG | DIASTOLIC BLOOD PRESSURE: 75 MMHG | OXYGEN SATURATION: 98 % | HEART RATE: 77 BPM | HEIGHT: 76.5 IN | WEIGHT: 229.28 LBS | TEMPERATURE: 98 F

## 2019-03-01 VITALS
RESPIRATION RATE: 14 BRPM | DIASTOLIC BLOOD PRESSURE: 74 MMHG | TEMPERATURE: 98 F | SYSTOLIC BLOOD PRESSURE: 129 MMHG | OXYGEN SATURATION: 99 %

## 2019-03-01 DIAGNOSIS — Z98.890 OTHER SPECIFIED POSTPROCEDURAL STATES: Chronic | ICD-10-CM

## 2019-03-01 DIAGNOSIS — N21.0 CALCULUS IN BLADDER: ICD-10-CM

## 2019-03-01 DIAGNOSIS — Z90.89 ACQUIRED ABSENCE OF OTHER ORGANS: Chronic | ICD-10-CM

## 2019-03-01 PROCEDURE — 52356 CYSTO/URETERO W/LITHOTRIPSY: CPT | Mod: RT

## 2019-03-01 PROCEDURE — 88300 SURGICAL PATH GROSS: CPT

## 2019-03-01 PROCEDURE — C1769: CPT

## 2019-03-01 PROCEDURE — 88300 SURGICAL PATH GROSS: CPT | Mod: 26

## 2019-03-01 PROCEDURE — C2617: CPT

## 2019-03-01 PROCEDURE — C1758: CPT

## 2019-03-01 PROCEDURE — 82365 CALCULUS SPECTROSCOPY: CPT

## 2019-03-01 RX ORDER — ONDANSETRON 8 MG/1
4 TABLET, FILM COATED ORAL ONCE
Qty: 0 | Refills: 0 | Status: DISCONTINUED | OUTPATIENT
Start: 2019-03-01 | End: 2019-03-01

## 2019-03-01 RX ORDER — HYDROMORPHONE HYDROCHLORIDE 2 MG/ML
0.5 INJECTION INTRAMUSCULAR; INTRAVENOUS; SUBCUTANEOUS
Qty: 0 | Refills: 0 | Status: DISCONTINUED | OUTPATIENT
Start: 2019-03-01 | End: 2019-03-01

## 2019-03-01 RX ORDER — OXYCODONE AND ACETAMINOPHEN 5; 325 MG/1; MG/1
1 TABLET ORAL EVERY 4 HOURS
Qty: 0 | Refills: 0 | Status: DISCONTINUED | OUTPATIENT
Start: 2019-03-01 | End: 2019-03-01

## 2019-03-01 RX ORDER — NITROFURANTOIN MACROCRYSTAL 50 MG
1 CAPSULE ORAL
Qty: 10 | Refills: 0
Start: 2019-03-01 | End: 2019-03-05

## 2019-03-01 RX ORDER — SODIUM CHLORIDE 9 MG/ML
1000 INJECTION, SOLUTION INTRAVENOUS
Qty: 0 | Refills: 0 | Status: DISCONTINUED | OUTPATIENT
Start: 2019-03-01 | End: 2019-03-01

## 2019-03-01 RX ADMIN — SODIUM CHLORIDE 50 MILLILITER(S): 9 INJECTION, SOLUTION INTRAVENOUS at 06:17

## 2019-03-01 NOTE — BRIEF OPERATIVE NOTE - PROCEDURE
<<-----Click on this checkbox to enter Procedure Cystoscopy  03/01/2019  lithalopaxy of bladder stones, right retrograde pyelogram, right ureteroscopy with laser lithotripsy of ureteral stones, right ureteral stent placement  Active  MALYSKEW

## 2019-03-01 NOTE — ASU DISCHARGE PLAN (ADULT/PEDIATRIC). - MEDICATION SUMMARY - MEDICATIONS TO TAKE
I will START or STAY ON the medications listed below when I get home from the hospital:    finasteride 5 mg oral tablet  -- 1 tab(s) by mouth once a day (at bedtime)  -- Indication: For BPH    oxycodone-acetaminophen 5 mg-325 mg oral tablet  -- 1 tab(s) by mouth every 6 hours, As Needed  moderate pain MDD:4  -- Indication: For moderate pain     tamsulosin 0.4 mg oral capsule  -- 1 cap(s) by mouth once a day (at bedtime)  -- Indication: For BPH    pantoprazole 20 mg oral delayed release tablet  -- 1 tab(s) by mouth once a day  -- Indication: For Gerd     Macrobid 100 mg oral capsule  -- 1 cap(s) by mouth 2 times a day MDD:2  -- Indication: For antibiotic     Vitamin B-100 oral tablet  -- 1 tab(s) by mouth once a day  -- Indication: For supplement     Multiple Vitamins oral tablet  -- 1 tab(s) by mouth once a day  -- Indication: For supplement     Vitamin C 500 mg oral tablet  -- 1 tab(s) by mouth once a day  -- Indication: For supplement

## 2019-03-01 NOTE — BRIEF OPERATIVE NOTE - OPERATION/FINDINGS
+ BPH with bladder wall trabeculation. No BT. Multiple bladder stones - fragmented with lithoclast and removed.    Impacted right ureteral stones at junction of mid and distal ureter. + hydronephrosis. Ureteral stones fragmented with holmium laser. Right stent placed.

## 2019-03-01 NOTE — ASU DISCHARGE PLAN (ADULT/PEDIATRIC). - NURSING INSTRUCTIONS
all discharge safety follow up care to MD, reviewed with patient and family.Follow up with Dr Moya on Monday March 4th

## 2019-03-04 LAB — SURGICAL PATHOLOGY STUDY: SIGNIFICANT CHANGE UP

## 2019-03-05 LAB — COMPN STONE: SIGNIFICANT CHANGE UP

## 2019-03-07 ENCOUNTER — APPOINTMENT (OUTPATIENT)
Dept: NEPHROLOGY | Facility: CLINIC | Age: 69
End: 2019-03-07
Payer: MEDICARE

## 2019-03-07 VITALS
BODY MASS INDEX: 26.78 KG/M2 | DIASTOLIC BLOOD PRESSURE: 74 MMHG | OXYGEN SATURATION: 97 % | WEIGHT: 231.48 LBS | HEART RATE: 80 BPM | HEIGHT: 78 IN | SYSTOLIC BLOOD PRESSURE: 130 MMHG

## 2019-03-07 VITALS — DIASTOLIC BLOOD PRESSURE: 76 MMHG | HEART RATE: 89 BPM | SYSTOLIC BLOOD PRESSURE: 127 MMHG

## 2019-03-07 PROBLEM — K21.9 GASTRO-ESOPHAGEAL REFLUX DISEASE WITHOUT ESOPHAGITIS: Chronic | Status: ACTIVE | Noted: 2019-02-25

## 2019-03-07 PROBLEM — N21.0 CALCULUS IN BLADDER: Chronic | Status: ACTIVE | Noted: 2019-02-25

## 2019-03-07 PROCEDURE — 99214 OFFICE O/P EST MOD 30 MIN: CPT

## 2019-03-07 RX ORDER — POLYETHYLENE GLYCOL 3350, SODIUM SULFATE ANHYDROUS, SODIUM BICARBONATE, SODIUM CHLORIDE, POTASSIUM CHLORIDE 227.1; 21.5; 6.36; 5.53; .754 G/L; G/L; G/L; G/L; G/L
227.1 POWDER, FOR SOLUTION ORAL
Qty: 1 | Refills: 0 | Status: DISCONTINUED | COMMUNITY
Start: 2018-12-10 | End: 2019-03-07

## 2019-03-07 RX ORDER — POLYETHYLENE GLYCOL 3350, SODIUM SULFATE, SODIUM CHLORIDE, POTASSIUM CHLORIDE, ASCORBIC ACID, SODIUM ASCORBATE 7.5-2.691G
100 KIT ORAL
Qty: 1 | Refills: 0 | Status: DISCONTINUED | COMMUNITY
Start: 2018-11-26 | End: 2019-03-07

## 2019-03-07 NOTE — PHYSICAL EXAM
[General Appearance - Alert] : alert [General Appearance - In No Acute Distress] : in no acute distress [General Appearance - Well Nourished] : well nourished [General Appearance - Well Developed] : well developed [General Appearance - Well-Appearing] : healthy appearing [Extraocular Movements] : extraocular movements were intact [Outer Ear] : the ears and nose were normal in appearance [Neck Appearance] : the appearance of the neck was normal [] : the neck was supple [Auscultation Breath Sounds / Voice Sounds] : lungs were clear to auscultation bilaterally [Heart Sounds] : normal S1 and S2 [Arterial Pulses Carotid] : carotid pulses were normal with no bruits [Bowel Sounds] : normal bowel sounds [Abdomen Soft] : soft [No CVA Tenderness] : no ~M costovertebral angle tenderness [Abnormal Walk] : normal gait [Skin Color & Pigmentation] : normal skin color and pigmentation [Sensation] : the sensory exam was normal to light touch and pinprick [Oriented To Time, Place, And Person] : oriented to person, place, and time [Impaired Insight] : insight and judgment were intact [Affect] : the affect was normal [Mood] : the mood was normal [Memory Recent] : recent memory was not impaired [Memory Remote] : remote memory was not impaired

## 2019-03-07 NOTE — ASSESSMENT
[FreeTextEntry1] : 1. Cystinuria/ cystine stones - reviewed management with patient.  He needs to increase oral fluid intake to achieve at least 3 liters of urine per day, add lemon juice to water and reduce salt intake.  Ideally, raising the urine pH will help with management and thus may require addition of potassium citrate dependent upon clinical course.  He has had an additional component of elevated urine calcium but no notation of calcium based stones. I will try to obtain stone composition from [facility] where he has had his procedures.  For now, would not add additional medication but can consider Captopril per course.  We will shorten his office visit intervals to 3 months in attempt to improve cystinuria and minimize stone formation.  Further recommendations per course.  Reviewed with patient and questions answered. \par Addendum: stone 7/2/18 - 100% cystine\par                              3/1/19 - 100% cystine

## 2019-03-07 NOTE — HISTORY OF PRESENT ILLNESS
[FreeTextEntry1] : Patient being managed for cystine stones.  These occurred starting at age 52.  He has required multiple interventional procedures to remove stones.  Previously on Cuprimine but stopped long ago due to price.  Not on other agents.  Was on HCTZ for edema but no longer.  He stated that his past stones were all cystine in composition.

## 2019-03-11 ENCOUNTER — OUTPATIENT (OUTPATIENT)
Dept: OUTPATIENT SERVICES | Facility: HOSPITAL | Age: 69
LOS: 1 days | End: 2019-03-11

## 2019-03-11 ENCOUNTER — APPOINTMENT (OUTPATIENT)
Dept: RADIOLOGY | Facility: HOSPITAL | Age: 69
End: 2019-03-11
Payer: MEDICARE

## 2019-03-11 ENCOUNTER — OUTPATIENT (OUTPATIENT)
Dept: OUTPATIENT SERVICES | Facility: HOSPITAL | Age: 69
LOS: 1 days | End: 2019-03-11
Payer: MEDICARE

## 2019-03-11 DIAGNOSIS — Z98.890 OTHER SPECIFIED POSTPROCEDURAL STATES: Chronic | ICD-10-CM

## 2019-03-11 DIAGNOSIS — Z90.89 ACQUIRED ABSENCE OF OTHER ORGANS: Chronic | ICD-10-CM

## 2019-03-11 DIAGNOSIS — Z00.8 ENCOUNTER FOR OTHER GENERAL EXAMINATION: ICD-10-CM

## 2019-03-11 PROCEDURE — 77080 DXA BONE DENSITY AXIAL: CPT

## 2019-03-11 PROCEDURE — 74018 RADEX ABDOMEN 1 VIEW: CPT | Mod: 26

## 2019-03-11 PROCEDURE — 77080 DXA BONE DENSITY AXIAL: CPT | Mod: 26

## 2019-03-11 PROCEDURE — 74018 RADEX ABDOMEN 1 VIEW: CPT

## 2019-03-25 ENCOUNTER — APPOINTMENT (OUTPATIENT)
Dept: CT IMAGING | Facility: HOSPITAL | Age: 69
End: 2019-03-25
Payer: MEDICARE

## 2019-03-25 ENCOUNTER — OUTPATIENT (OUTPATIENT)
Dept: OUTPATIENT SERVICES | Facility: HOSPITAL | Age: 69
LOS: 1 days | End: 2019-03-25
Payer: MEDICARE

## 2019-03-25 DIAGNOSIS — Z98.890 OTHER SPECIFIED POSTPROCEDURAL STATES: Chronic | ICD-10-CM

## 2019-03-25 DIAGNOSIS — Z90.89 ACQUIRED ABSENCE OF OTHER ORGANS: Chronic | ICD-10-CM

## 2019-03-25 DIAGNOSIS — Z00.8 ENCOUNTER FOR OTHER GENERAL EXAMINATION: ICD-10-CM

## 2019-03-25 PROCEDURE — 74176 CT ABD & PELVIS W/O CONTRAST: CPT

## 2019-03-25 PROCEDURE — 74176 CT ABD & PELVIS W/O CONTRAST: CPT | Mod: 26

## 2019-05-28 ENCOUNTER — EMERGENCY (EMERGENCY)
Facility: HOSPITAL | Age: 69
LOS: 1 days | Discharge: HOME | End: 2019-05-28
Attending: EMERGENCY MEDICINE | Admitting: EMERGENCY MEDICINE
Payer: COMMERCIAL

## 2019-05-28 VITALS
HEART RATE: 78 BPM | OXYGEN SATURATION: 99 % | SYSTOLIC BLOOD PRESSURE: 122 MMHG | RESPIRATION RATE: 16 BRPM | DIASTOLIC BLOOD PRESSURE: 62 MMHG

## 2019-05-28 VITALS
HEART RATE: 77 BPM | SYSTOLIC BLOOD PRESSURE: 133 MMHG | WEIGHT: 229.94 LBS | HEIGHT: 77 IN | RESPIRATION RATE: 16 BRPM | OXYGEN SATURATION: 99 % | DIASTOLIC BLOOD PRESSURE: 77 MMHG | TEMPERATURE: 98 F

## 2019-05-28 DIAGNOSIS — S69.90XA UNSPECIFIED INJURY OF UNSPECIFIED WRIST, HAND AND FINGER(S), INITIAL ENCOUNTER: ICD-10-CM

## 2019-05-28 DIAGNOSIS — Z98.890 OTHER SPECIFIED POSTPROCEDURAL STATES: Chronic | ICD-10-CM

## 2019-05-28 DIAGNOSIS — Z90.89 ACQUIRED ABSENCE OF OTHER ORGANS: Chronic | ICD-10-CM

## 2019-05-28 PROCEDURE — 73130 X-RAY EXAM OF HAND: CPT

## 2019-05-28 PROCEDURE — 99283 EMERGENCY DEPT VISIT LOW MDM: CPT | Mod: 25

## 2019-05-28 PROCEDURE — 73130 X-RAY EXAM OF HAND: CPT | Mod: 26,RT

## 2019-05-28 PROCEDURE — 73110 X-RAY EXAM OF WRIST: CPT

## 2019-05-28 PROCEDURE — 73110 X-RAY EXAM OF WRIST: CPT | Mod: 26,RT

## 2019-05-28 PROCEDURE — 99053 MED SERV 10PM-8AM 24 HR FAC: CPT

## 2019-05-28 PROCEDURE — 99284 EMERGENCY DEPT VISIT MOD MDM: CPT

## 2019-05-28 NOTE — ED PROVIDER NOTE - CLINICAL SUMMARY MEDICAL DECISION MAKING FREE TEXT BOX
pt has minor wrist sprain, negative xrays, provided , premade wrist splint, advise cold compress and NSAIDS

## 2019-05-28 NOTE — ED ADULT NURSE NOTE - NS ED NURSE LEVEL OF CONSCIOUSNESS ORIENTATION
Substernal chest pain, bilateral arm weakness.   Sent to emergency department for further evaluation and treatment
Oriented - self; Oriented - place; Oriented - time

## 2019-05-28 NOTE — ED PROVIDER NOTE - OBJECTIVE STATEMENT
67 y/o male presents to ed c/o right hand and wrist since yesterday s/p fall on out stretched hand yesterday morning. slow pain and swelling increased.

## 2019-06-13 ENCOUNTER — APPOINTMENT (OUTPATIENT)
Dept: NEPHROLOGY | Facility: CLINIC | Age: 69
End: 2019-06-13
Payer: MEDICARE

## 2019-06-13 VITALS
OXYGEN SATURATION: 98 % | WEIGHT: 235.89 LBS | SYSTOLIC BLOOD PRESSURE: 138 MMHG | HEART RATE: 72 BPM | HEIGHT: 78 IN | DIASTOLIC BLOOD PRESSURE: 79 MMHG | BODY MASS INDEX: 27.29 KG/M2

## 2019-06-13 PROCEDURE — 99214 OFFICE O/P EST MOD 30 MIN: CPT

## 2019-06-13 NOTE — HISTORY OF PRESENT ILLNESS
[FreeTextEntry1] : Patient being managed for cystine stones.  These occurred starting at age 52.  He has required multiple interventional procedures to remove stones.  Previously on Cuprimine but stopped long ago due to price.  Not on other agents.  Was on HCTZ for edema but no longer.  He stated that his past stones were all cystine in composition.  Continues with periodic stone passage.

## 2019-06-13 NOTE — ASSESSMENT
[FreeTextEntry1] : 1. Cystinuria/ cystine stones - reviewed management with patient.  He still needs to increase oral fluid intake to achieve at least 3 liters of urine per day, adding lemon juice to water.  Ideally, raising the urine pH will help with management and thus may require addition of potassium citrate dependent upon clinical course.  For now, would not add additional medication but can consider Captopril per course.  He will increase oral fluids to achieve 3 liters of urine daily, repeat urine collection for stonerisk diagnostic profile along with random urine cystine in 4-6 weeks.  Further recommendations per course.  Reviewed with patient and questions answered. \par Addendum: stone 7/2/18 - 100% cystine\par                              3/1/19 - 100% cystine

## 2019-06-13 NOTE — PHYSICAL EXAM
[General Appearance - In No Acute Distress] : in no acute distress [General Appearance - Well Nourished] : well nourished [General Appearance - Well Developed] : well developed [General Appearance - Alert] : alert [General Appearance - Well-Appearing] : healthy appearing [Extraocular Movements] : extraocular movements were intact [Neck Appearance] : the appearance of the neck was normal [Outer Ear] : the ears and nose were normal in appearance [Auscultation Breath Sounds / Voice Sounds] : lungs were clear to auscultation bilaterally [] : the neck was supple [Heart Sounds] : normal S1 and S2 [Abdomen Soft] : soft [Bowel Sounds] : normal bowel sounds [Arterial Pulses Carotid] : carotid pulses were normal with no bruits [Skin Color & Pigmentation] : normal skin color and pigmentation [Abnormal Walk] : normal gait [No CVA Tenderness] : no ~M costovertebral angle tenderness [Oriented To Time, Place, And Person] : oriented to person, place, and time [Sensation] : the sensory exam was normal to light touch and pinprick [Affect] : the affect was normal [Impaired Insight] : insight and judgment were intact [Mood] : the mood was normal [Memory Recent] : recent memory was not impaired [Memory Remote] : remote memory was not impaired

## 2019-06-20 ENCOUNTER — APPOINTMENT (OUTPATIENT)
Dept: RADIOLOGY | Facility: HOSPITAL | Age: 69
End: 2019-06-20
Payer: MEDICARE

## 2019-06-20 ENCOUNTER — OUTPATIENT (OUTPATIENT)
Dept: OUTPATIENT SERVICES | Facility: HOSPITAL | Age: 69
LOS: 1 days | End: 2019-06-20
Payer: MEDICARE

## 2019-06-20 ENCOUNTER — APPOINTMENT (OUTPATIENT)
Dept: ULTRASOUND IMAGING | Facility: HOSPITAL | Age: 69
End: 2019-06-20
Payer: MEDICARE

## 2019-06-20 DIAGNOSIS — Z90.89 ACQUIRED ABSENCE OF OTHER ORGANS: Chronic | ICD-10-CM

## 2019-06-20 DIAGNOSIS — Z98.890 OTHER SPECIFIED POSTPROCEDURAL STATES: Chronic | ICD-10-CM

## 2019-06-20 DIAGNOSIS — Z00.8 ENCOUNTER FOR OTHER GENERAL EXAMINATION: ICD-10-CM

## 2019-06-20 PROCEDURE — 74018 RADEX ABDOMEN 1 VIEW: CPT | Mod: 26

## 2019-06-20 PROCEDURE — 74018 RADEX ABDOMEN 1 VIEW: CPT

## 2019-06-20 PROCEDURE — 76770 US EXAM ABDO BACK WALL COMP: CPT | Mod: 26

## 2019-06-20 PROCEDURE — 0: CUSTOM

## 2019-06-20 PROCEDURE — 76775 US EXAM ABDO BACK WALL LIM: CPT | Mod: 26

## 2019-06-20 PROCEDURE — 76770 US EXAM ABDO BACK WALL COMP: CPT

## 2019-06-20 PROCEDURE — 76775 US EXAM ABDO BACK WALL LIM: CPT

## 2019-09-30 ENCOUNTER — LABORATORY RESULT (OUTPATIENT)
Age: 69
End: 2019-09-30

## 2019-11-18 NOTE — ASU PREOP CHECKLIST - SIDE RAILS UP
done
Detail Level: Zone
Other (Free Text): Patient reports he is still getting new acne
Note Text (......Xxx Chief Complaint.): This diagnosis correlates with the

## 2019-12-05 ENCOUNTER — APPOINTMENT (OUTPATIENT)
Dept: NEPHROLOGY | Facility: CLINIC | Age: 69
End: 2019-12-05

## 2020-05-24 ENCOUNTER — TRANSCRIPTION ENCOUNTER (OUTPATIENT)
Age: 70
End: 2020-05-24

## 2021-02-02 ENCOUNTER — TRANSCRIPTION ENCOUNTER (OUTPATIENT)
Age: 71
End: 2021-02-02

## 2021-02-16 ENCOUNTER — APPOINTMENT (OUTPATIENT)
Dept: CT IMAGING | Facility: HOSPITAL | Age: 71
End: 2021-02-16
Payer: MEDICARE

## 2021-02-16 ENCOUNTER — OUTPATIENT (OUTPATIENT)
Dept: OUTPATIENT SERVICES | Facility: HOSPITAL | Age: 71
LOS: 1 days | End: 2021-02-16
Payer: MEDICARE

## 2021-02-16 DIAGNOSIS — Z90.89 ACQUIRED ABSENCE OF OTHER ORGANS: Chronic | ICD-10-CM

## 2021-02-16 DIAGNOSIS — Z00.8 ENCOUNTER FOR OTHER GENERAL EXAMINATION: ICD-10-CM

## 2021-02-16 DIAGNOSIS — Z98.890 OTHER SPECIFIED POSTPROCEDURAL STATES: Chronic | ICD-10-CM

## 2021-02-16 PROCEDURE — 70486 CT MAXILLOFACIAL W/O DYE: CPT | Mod: 26,MH

## 2021-02-16 PROCEDURE — 70486 CT MAXILLOFACIAL W/O DYE: CPT

## 2021-02-22 NOTE — H&P PST ADULT - NSCAFFEAMTFREQ_GEN_ALL_CORE_SD
Letter written by Dr. Agbeh. Printed.     Attempted to contact patient - no answer/VM full.    If she calls back, please ask if patient wants this faxed or how she would like to get this. She can also access the letter via [employer].  Koki Kunz CMA       1-2 cups/cans per day

## 2021-02-23 ENCOUNTER — OUTPATIENT (OUTPATIENT)
Dept: OUTPATIENT SERVICES | Facility: HOSPITAL | Age: 71
LOS: 1 days | End: 2021-02-23
Payer: MEDICARE

## 2021-02-23 VITALS
TEMPERATURE: 98 F | OXYGEN SATURATION: 98 % | DIASTOLIC BLOOD PRESSURE: 74 MMHG | HEART RATE: 74 BPM | SYSTOLIC BLOOD PRESSURE: 139 MMHG | WEIGHT: 220.46 LBS | HEIGHT: 76 IN | RESPIRATION RATE: 15 BRPM

## 2021-02-23 DIAGNOSIS — Z98.890 OTHER SPECIFIED POSTPROCEDURAL STATES: Chronic | ICD-10-CM

## 2021-02-23 DIAGNOSIS — Z90.89 ACQUIRED ABSENCE OF OTHER ORGANS: Chronic | ICD-10-CM

## 2021-02-23 DIAGNOSIS — N21.0 CALCULUS IN BLADDER: ICD-10-CM

## 2021-02-23 DIAGNOSIS — Z01.818 ENCOUNTER FOR OTHER PREPROCEDURAL EXAMINATION: ICD-10-CM

## 2021-02-23 LAB
ALBUMIN SERPL ELPH-MCNC: 3.6 G/DL — SIGNIFICANT CHANGE UP (ref 3.3–5)
ALP SERPL-CCNC: 58 U/L — SIGNIFICANT CHANGE UP (ref 40–120)
ALT FLD-CCNC: 32 U/L — SIGNIFICANT CHANGE UP (ref 10–45)
ANION GAP SERPL CALC-SCNC: 8 MMOL/L — SIGNIFICANT CHANGE UP (ref 5–17)
APPEARANCE UR: CLEAR — SIGNIFICANT CHANGE UP
AST SERPL-CCNC: 17 U/L — SIGNIFICANT CHANGE UP (ref 10–40)
BILIRUB SERPL-MCNC: 0.4 MG/DL — SIGNIFICANT CHANGE UP (ref 0.2–1.2)
BILIRUB UR-MCNC: NEGATIVE — SIGNIFICANT CHANGE UP
BUN SERPL-MCNC: 17 MG/DL — SIGNIFICANT CHANGE UP (ref 7–23)
CALCIUM SERPL-MCNC: 9.2 MG/DL — SIGNIFICANT CHANGE UP (ref 8.4–10.5)
CHLORIDE SERPL-SCNC: 106 MMOL/L — SIGNIFICANT CHANGE UP (ref 96–108)
CO2 SERPL-SCNC: 26 MMOL/L — SIGNIFICANT CHANGE UP (ref 22–31)
COLOR SPEC: YELLOW — SIGNIFICANT CHANGE UP
CREAT SERPL-MCNC: 0.88 MG/DL — SIGNIFICANT CHANGE UP (ref 0.5–1.3)
DIFF PNL FLD: NEGATIVE — SIGNIFICANT CHANGE UP
GLUCOSE SERPL-MCNC: 82 MG/DL — SIGNIFICANT CHANGE UP (ref 70–99)
GLUCOSE UR QL: NEGATIVE — SIGNIFICANT CHANGE UP
HCT VFR BLD CALC: 48 % — SIGNIFICANT CHANGE UP (ref 39–50)
HGB BLD-MCNC: 15.8 G/DL — SIGNIFICANT CHANGE UP (ref 13–17)
KETONES UR-MCNC: NEGATIVE — SIGNIFICANT CHANGE UP
LEUKOCYTE ESTERASE UR-ACNC: NEGATIVE — SIGNIFICANT CHANGE UP
MCHC RBC-ENTMCNC: 31.9 PG — SIGNIFICANT CHANGE UP (ref 27–34)
MCHC RBC-ENTMCNC: 32.9 GM/DL — SIGNIFICANT CHANGE UP (ref 32–36)
MCV RBC AUTO: 97 FL — SIGNIFICANT CHANGE UP (ref 80–100)
NITRITE UR-MCNC: NEGATIVE — SIGNIFICANT CHANGE UP
NRBC # BLD: 0 /100 WBCS — SIGNIFICANT CHANGE UP (ref 0–0)
PH UR: 6.5 — SIGNIFICANT CHANGE UP (ref 5–8)
PLATELET # BLD AUTO: 192 K/UL — SIGNIFICANT CHANGE UP (ref 150–400)
POTASSIUM SERPL-MCNC: 3.9 MMOL/L — SIGNIFICANT CHANGE UP (ref 3.5–5.3)
POTASSIUM SERPL-SCNC: 3.9 MMOL/L — SIGNIFICANT CHANGE UP (ref 3.5–5.3)
PROT SERPL-MCNC: 6.7 G/DL — SIGNIFICANT CHANGE UP (ref 6–8.3)
PROT UR-MCNC: NEGATIVE — SIGNIFICANT CHANGE UP
RBC # BLD: 4.95 M/UL — SIGNIFICANT CHANGE UP (ref 4.2–5.8)
RBC # FLD: 13.6 % — SIGNIFICANT CHANGE UP (ref 10.3–14.5)
SODIUM SERPL-SCNC: 140 MMOL/L — SIGNIFICANT CHANGE UP (ref 135–145)
SP GR SPEC: 1.02 — SIGNIFICANT CHANGE UP (ref 1.01–1.02)
UROBILINOGEN FLD QL: NEGATIVE — SIGNIFICANT CHANGE UP
WBC # BLD: 6.76 K/UL — SIGNIFICANT CHANGE UP (ref 3.8–10.5)
WBC # FLD AUTO: 6.76 K/UL — SIGNIFICANT CHANGE UP (ref 3.8–10.5)

## 2021-02-23 PROCEDURE — 36415 COLL VENOUS BLD VENIPUNCTURE: CPT

## 2021-02-23 PROCEDURE — G0463: CPT

## 2021-02-23 PROCEDURE — 87086 URINE CULTURE/COLONY COUNT: CPT

## 2021-02-23 PROCEDURE — 85027 COMPLETE CBC AUTOMATED: CPT

## 2021-02-23 PROCEDURE — 80053 COMPREHEN METABOLIC PANEL: CPT

## 2021-02-23 NOTE — H&P PST ADULT - NSICDXPASTSURGICALHX_GEN_ALL_CORE_FT
PAST SURGICAL HISTORY:  H/O lithotripsy x4    S/P colonoscopy 2018, no polyps    S/P cystoscopy 2015 bladder stone extraction    S/P T&A (status post tonsillectomy and adenoidectomy) as child

## 2021-02-23 NOTE — H&P PST ADULT - ASSESSMENT
71 yo M for  cystoscopy and lithalopaxy     71 yo M for  cystoscopy and lithalopaxy    Medical clearance pending  COVID TBS

## 2021-02-23 NOTE — H&P PST ADULT - NSICDXFAMILYHX_GEN_ALL_CORE_FT
FAMILY HISTORY:  Mother  Still living? No  Family history of cirrhosis of liver, Age at diagnosis: Age Unknown  Family history of Parkinson disease, Age at diagnosis: Age Unknown

## 2021-02-23 NOTE — H&P PST ADULT - NSICDXPASTMEDICALHX_GEN_ALL_CORE_FT
PAST MEDICAL HISTORY:  BPH (benign prostatic hyperplasia) no symptoms    Calculus in bladder     GERD without esophagitis     Heart murmur     Lumbar disc herniation unsure level    Osteoarthritis     Renal calculi multiple episodes of bladder and kidney stones (cysteine stones)    Varicose veins bilateral     PAST MEDICAL HISTORY:  BPH (benign prostatic hyperplasia) no symptoms    Calculus in bladder     COVID-19 Jan 2021    GERD without esophagitis     Heart murmur     Lumbar disc herniation unsure level    Osteoarthritis     Renal calculi multiple episodes of bladder and kidney stones (cysteine stones)    Skin cancer LLE    Varicose veins bilateral

## 2021-02-24 LAB
CULTURE RESULTS: SIGNIFICANT CHANGE UP
SPECIMEN SOURCE: SIGNIFICANT CHANGE UP

## 2021-03-15 ENCOUNTER — TRANSCRIPTION ENCOUNTER (OUTPATIENT)
Age: 71
End: 2021-03-15

## 2021-03-18 PROBLEM — C44.90 UNSPECIFIED MALIGNANT NEOPLASM OF SKIN, UNSPECIFIED: Chronic | Status: ACTIVE | Noted: 2021-02-23

## 2021-04-14 ENCOUNTER — OUTPATIENT (OUTPATIENT)
Dept: OUTPATIENT SERVICES | Facility: HOSPITAL | Age: 71
LOS: 1 days | End: 2021-04-14
Payer: MEDICARE

## 2021-04-14 VITALS
HEIGHT: 76 IN | OXYGEN SATURATION: 98 % | RESPIRATION RATE: 18 BRPM | HEART RATE: 77 BPM | WEIGHT: 226.19 LBS | SYSTOLIC BLOOD PRESSURE: 125 MMHG | DIASTOLIC BLOOD PRESSURE: 72 MMHG | TEMPERATURE: 98 F

## 2021-04-14 DIAGNOSIS — J34.3 HYPERTROPHY OF NASAL TURBINATES: ICD-10-CM

## 2021-04-14 DIAGNOSIS — Z98.890 OTHER SPECIFIED POSTPROCEDURAL STATES: Chronic | ICD-10-CM

## 2021-04-14 DIAGNOSIS — Z01.818 ENCOUNTER FOR OTHER PREPROCEDURAL EXAMINATION: ICD-10-CM

## 2021-04-14 DIAGNOSIS — K21.9 GASTRO-ESOPHAGEAL REFLUX DISEASE WITHOUT ESOPHAGITIS: ICD-10-CM

## 2021-04-14 DIAGNOSIS — N40.0 BENIGN PROSTATIC HYPERPLASIA WITHOUT LOWER URINARY TRACT SYMPTOMS: ICD-10-CM

## 2021-04-14 DIAGNOSIS — N21.0 CALCULUS IN BLADDER: ICD-10-CM

## 2021-04-14 DIAGNOSIS — Z90.89 ACQUIRED ABSENCE OF OTHER ORGANS: Chronic | ICD-10-CM

## 2021-04-14 LAB
ANION GAP SERPL CALC-SCNC: 8 MMOL/L — SIGNIFICANT CHANGE UP (ref 5–17)
APPEARANCE UR: CLEAR — SIGNIFICANT CHANGE UP
APTT BLD: 31.5 SEC — SIGNIFICANT CHANGE UP (ref 27.5–35.5)
BACTERIA # UR AUTO: ABNORMAL /HPF
BILIRUB UR-MCNC: NEGATIVE — SIGNIFICANT CHANGE UP
BUN SERPL-MCNC: 16 MG/DL — SIGNIFICANT CHANGE UP (ref 7–23)
CALCIUM SERPL-MCNC: 9.5 MG/DL — SIGNIFICANT CHANGE UP (ref 8.4–10.5)
CHLORIDE SERPL-SCNC: 107 MMOL/L — SIGNIFICANT CHANGE UP (ref 96–108)
CO2 SERPL-SCNC: 27 MMOL/L — SIGNIFICANT CHANGE UP (ref 22–31)
COLOR SPEC: YELLOW — SIGNIFICANT CHANGE UP
CREAT SERPL-MCNC: 0.98 MG/DL — SIGNIFICANT CHANGE UP (ref 0.5–1.3)
DIFF PNL FLD: ABNORMAL
EPI CELLS # UR: SIGNIFICANT CHANGE UP
GLUCOSE SERPL-MCNC: 82 MG/DL — SIGNIFICANT CHANGE UP (ref 70–99)
GLUCOSE UR QL: NEGATIVE — SIGNIFICANT CHANGE UP
HCT VFR BLD CALC: 48.4 % — SIGNIFICANT CHANGE UP (ref 39–50)
HGB BLD-MCNC: 16.6 G/DL — SIGNIFICANT CHANGE UP (ref 13–17)
INR BLD: 0.97 RATIO — SIGNIFICANT CHANGE UP (ref 0.88–1.16)
KETONES UR-MCNC: NEGATIVE — SIGNIFICANT CHANGE UP
LEUKOCYTE ESTERASE UR-ACNC: NEGATIVE — SIGNIFICANT CHANGE UP
MCHC RBC-ENTMCNC: 32.7 PG — SIGNIFICANT CHANGE UP (ref 27–34)
MCHC RBC-ENTMCNC: 34.3 GM/DL — SIGNIFICANT CHANGE UP (ref 32–36)
MCV RBC AUTO: 95.5 FL — SIGNIFICANT CHANGE UP (ref 80–100)
NITRITE UR-MCNC: NEGATIVE — SIGNIFICANT CHANGE UP
NRBC # BLD: 0 /100 WBCS — SIGNIFICANT CHANGE UP (ref 0–0)
PH UR: 7 — SIGNIFICANT CHANGE UP (ref 5–8)
PLATELET # BLD AUTO: 211 K/UL — SIGNIFICANT CHANGE UP (ref 150–400)
POTASSIUM SERPL-MCNC: 3.9 MMOL/L — SIGNIFICANT CHANGE UP (ref 3.5–5.3)
POTASSIUM SERPL-SCNC: 3.9 MMOL/L — SIGNIFICANT CHANGE UP (ref 3.5–5.3)
PROT UR-MCNC: 500 MG/DL
PROTHROM AB SERPL-ACNC: 11.7 SEC — SIGNIFICANT CHANGE UP (ref 10.6–13.6)
RBC # BLD: 5.07 M/UL — SIGNIFICANT CHANGE UP (ref 4.2–5.8)
RBC # FLD: 13 % — SIGNIFICANT CHANGE UP (ref 10.3–14.5)
RBC CASTS # UR COMP ASSIST: ABNORMAL /HPF (ref 0–4)
SODIUM SERPL-SCNC: 142 MMOL/L — SIGNIFICANT CHANGE UP (ref 135–145)
SP GR SPEC: 1.01 — SIGNIFICANT CHANGE UP (ref 1.01–1.02)
UROBILINOGEN FLD QL: NEGATIVE — SIGNIFICANT CHANGE UP
WBC # BLD: 6.34 K/UL — SIGNIFICANT CHANGE UP (ref 3.8–10.5)
WBC # FLD AUTO: 6.34 K/UL — SIGNIFICANT CHANGE UP (ref 3.8–10.5)
WBC UR QL: SIGNIFICANT CHANGE UP /HPF (ref 0–5)

## 2021-04-14 PROCEDURE — 85027 COMPLETE CBC AUTOMATED: CPT

## 2021-04-14 PROCEDURE — 36415 COLL VENOUS BLD VENIPUNCTURE: CPT

## 2021-04-14 PROCEDURE — 80048 BASIC METABOLIC PNL TOTAL CA: CPT

## 2021-04-14 PROCEDURE — G0463: CPT

## 2021-04-14 PROCEDURE — 85610 PROTHROMBIN TIME: CPT

## 2021-04-14 PROCEDURE — 71046 X-RAY EXAM CHEST 2 VIEWS: CPT | Mod: 26

## 2021-04-14 PROCEDURE — 71046 X-RAY EXAM CHEST 2 VIEWS: CPT

## 2021-04-14 PROCEDURE — 81001 URINALYSIS AUTO W/SCOPE: CPT

## 2021-04-14 PROCEDURE — 87086 URINE CULTURE/COLONY COUNT: CPT

## 2021-04-14 PROCEDURE — 85730 THROMBOPLASTIN TIME PARTIAL: CPT

## 2021-04-14 NOTE — H&P PST ADULT - NSANTHOSAYNRD_GEN_A_CORE
neck 17.75 inches/No. MARCI screening performed.  STOP BANG Legend: 0-2 = LOW Risk; 3-4 = INTERMEDIATE Risk; 5-8 = HIGH Risk

## 2021-04-14 NOTE — H&P PST ADULT - NSICDXFAMILYHX_GEN_ALL_CORE_FT
FAMILY HISTORY:  Father  Still living? Unknown  FH: Parkinson's disease, Age at diagnosis: Age Unknown    Mother  Still living? No  Family history of cirrhosis of liver, Age at diagnosis: Age Unknown

## 2021-04-14 NOTE — H&P PST ADULT - BREASTS
Subjective:  minimal pleuritic pain    MEDICATIONS  (STANDING):  aspirin enteric coated 81 milliGRAM(s) Oral daily  enoxaparin Injectable 40 milliGRAM(s) SubCutaneous every 24 hours  ibuprofen  Tablet 600 milliGRAM(s) Oral every 6 hours  pantoprazole    Tablet 40 milliGRAM(s) Oral before breakfast  sodium chloride 0.9%. 1000 milliLiter(s) (10 mL/Hr) IV Continuous <Continuous>    MEDICATIONS  (PRN):  acetaminophen   Tablet 650 milliGRAM(s) Oral every 8 hours PRN For Temp greater than 38 C (100.4 F)  acetaminophen   Tablet. 650 milliGRAM(s) Oral every 8 hours PRN Mild Pain (1 - 3)  morphine  - Injectable 4 milliGRAM(s) IV Push every 6 hours PRN Severe Pain (7 - 10)      Allergies    No Known Allergies    Intolerances        REVIEW OF SYSTEMS:    CONSTITUTIONAL:  As per HPI.  HEENT:  Eyes:  No diplopia or blurred vision. ENT:  No earache, sore throat or runny nose.  CARDIOVASCULAR:  No pressure, squeezing, tightness, heaviness or aching about the chest, neck, axilla or epigastrium.  RESPIRATORY:  No cough, shortness of breath, PND or orthopnea.  GASTROINTESTINAL:  No nausea, vomiting or diarrhea.  GENITOURINARY:  No dysuria, frequency or urgency.  MUSCULOSKELETAL:  no joint pain, deformity, tenderness  EXTREMITIES: no clubbing cyanosis,edema  SKIN:  No change in skin, hair or nails.  NEUROLOGIC:  No paresthesias, fasciculations, seizures or weakness.  PSYCHIATRIC:  No disorder of thought or mood.  ENDOCRINE:  No heat or cold intolerance, polyuria or polydipsia.  HEMATOLOGICAL:  No easy bruising or bleedings:    Vital Signs Last 24 Hrs  T(C): 36.8 (01 Sep 2018 10:06), Max: 36.9 (31 Aug 2018 20:46)  T(F): 98.3 (01 Sep 2018 10:06), Max: 98.4 (31 Aug 2018 20:46)  HR: 102 (01 Sep 2018 10:06) (91 - 120)  BP: 107/63 (01 Sep 2018 10:06) (94/56 - 114/78)  BP(mean): --  RR: 18 (01 Sep 2018 10:06) (15 - 18)  SpO2: 94% (01 Sep 2018 10:06) (94% - 100%)    PHYSICAL EXAMINATION:  SKIN: no rashes  HEAD: NC/AT  EYES: PERRLA, EOMI  EARS: TM's intact  NOSE: no abnormalities  NECK:  Supple. No lymphadenopathy. Jugular venous pressure not elevated. Carotids equal.   HEART:   The cardiac impulse has a normal quality. Reg., Nl S1 and S2.  There are no murmurs, rubs or gallops noted  CHEST:  Chest is clear to auscultation. Normal respiratory effort.  ABDOMEN:  Soft and nontender.   EXTREMITIES:  no C/C/E  NEURO: AAO x 3, no focal deficts       LABS:                        13.5   6.60  )-----------( 157      ( 31 Aug 2018 10:30 )             38.5     08-31    142  |  108  |  14  ----------------------------<  108<H>  4.1   |  28  |  0.76    Ca    9.3      31 Aug 2018 10:30    TPro  7.5  /  Alb  4.4  /  TBili  0.6  /  DBili  x   /  AST  21  /  ALT  30  /  AlkPhos  95  08-30    PT/INR - ( 30 Aug 2018 23:45 )   PT: 11.0 sec;   INR: 1.02 ratio         PTT - ( 30 Aug 2018 23:45 )  PTT:32.0 sec      RADIOLOGY & ADDITIONAL TESTS: No masses; no nipple discharge

## 2021-04-14 NOTE — H&P PST ADULT - HISTORY OF PRESENT ILLNESS
69 yo M for cystoscopy and lithalopaxy  71 y/o male with PMH of bladder stones, BPH, COVID-19 ( 1/2021 without hospitalization) and GERD presents for PST.  Scheduled for bilateral inferior turbinate cautery with Dr De La Cruz on 04/28/2021 and cystoscopy, lithalopxy with Dr Moya on 05/05/2021.  COVID-19 TBS 72 hours before both procedures - information provided

## 2021-04-14 NOTE — H&P PST ADULT - NSICDXPASTMEDICALHX_GEN_ALL_CORE_FT
PAST MEDICAL HISTORY:  BPH (benign prostatic hyperplasia) no symptoms    Calculus in bladder     COVID-19 Jan 2021- mild case- no antibiotics or hospitalization required    GERD without esophagitis     Heart murmur     Lumbar disc herniation unsure level    Osteoarthritis     Renal calculi multiple episodes of bladder and kidney stones (cysteine stones)    Skin cancer LLE    Varicose veins bilateral

## 2021-04-14 NOTE — H&P PST ADULT - NSICDXPROBLEM_GEN_ALL_CORE_FT
PROBLEM DIAGNOSES  Problem: BPH (benign prostatic hyperplasia)  Assessment and Plan: Takes medcations as prescribed     Problem: GERD (gastroesophageal reflux disease)  Assessment and Plan: Takes medications as prescribed     Problem: Hypertrophy of nasal turbinates  Assessment and Plan: Scheduled for bilateral inferior turbinate cautery with Dr Leija on 04/28/2021.  Pre op instructions given and patient verbalized understanding.  CBC, BMP, PT/PTT/INR pending.  EKG on chart.  CHest x ray and medical clearance pending.  MARCI precautions - STOP BANG 3  COVID-19 test TBS- information provided     Problem: Calculus of bladder  Assessment and Plan: Scheduled for cystoscopy, lithalopaxy on 05/05/2021 with Dr Moya.  Pre op instructions given and patient verbalized understanding.  CBC, BMP, UA and urine culture pending.  CHest x ray and medical clearance pending.  COVID-19 test TBS - information provided.  MARCI precautions- STOP BANG 3

## 2021-04-15 LAB
CULTURE RESULTS: SIGNIFICANT CHANGE UP
SPECIMEN SOURCE: SIGNIFICANT CHANGE UP

## 2021-04-27 ENCOUNTER — TRANSCRIPTION ENCOUNTER (OUTPATIENT)
Age: 71
End: 2021-04-27

## 2021-04-28 ENCOUNTER — OUTPATIENT (OUTPATIENT)
Dept: OUTPATIENT SERVICES | Facility: HOSPITAL | Age: 71
LOS: 1 days | End: 2021-04-28
Payer: MEDICARE

## 2021-04-28 VITALS
TEMPERATURE: 98 F | HEIGHT: 76 IN | SYSTOLIC BLOOD PRESSURE: 128 MMHG | HEART RATE: 77 BPM | WEIGHT: 226.19 LBS | DIASTOLIC BLOOD PRESSURE: 76 MMHG | RESPIRATION RATE: 18 BRPM | OXYGEN SATURATION: 97 %

## 2021-04-28 VITALS
DIASTOLIC BLOOD PRESSURE: 70 MMHG | SYSTOLIC BLOOD PRESSURE: 122 MMHG | TEMPERATURE: 97 F | RESPIRATION RATE: 16 BRPM | HEART RATE: 75 BPM

## 2021-04-28 DIAGNOSIS — J34.3 HYPERTROPHY OF NASAL TURBINATES: ICD-10-CM

## 2021-04-28 DIAGNOSIS — Z98.890 OTHER SPECIFIED POSTPROCEDURAL STATES: Chronic | ICD-10-CM

## 2021-04-28 DIAGNOSIS — Z90.89 ACQUIRED ABSENCE OF OTHER ORGANS: Chronic | ICD-10-CM

## 2021-04-28 PROCEDURE — 30801 ABLATE INF TURBINATE SUPERF: CPT

## 2021-04-28 RX ORDER — SODIUM CHLORIDE 9 MG/ML
1000 INJECTION, SOLUTION INTRAVENOUS
Refills: 0 | Status: DISCONTINUED | OUTPATIENT
Start: 2021-04-28 | End: 2021-04-28

## 2021-04-28 RX ORDER — NITROFURANTOIN MACROCRYSTAL 50 MG
1 CAPSULE ORAL
Qty: 0 | Refills: 0 | DISCHARGE

## 2021-04-28 RX ORDER — OXYCODONE HYDROCHLORIDE 5 MG/1
5 TABLET ORAL ONCE
Refills: 0 | Status: DISCONTINUED | OUTPATIENT
Start: 2021-04-28 | End: 2021-04-28

## 2021-04-28 RX ORDER — HYDROMORPHONE HYDROCHLORIDE 2 MG/ML
0.5 INJECTION INTRAMUSCULAR; INTRAVENOUS; SUBCUTANEOUS
Refills: 0 | Status: DISCONTINUED | OUTPATIENT
Start: 2021-04-28 | End: 2021-04-28

## 2021-04-28 RX ORDER — ONDANSETRON 8 MG/1
4 TABLET, FILM COATED ORAL ONCE
Refills: 0 | Status: DISCONTINUED | OUTPATIENT
Start: 2021-04-28 | End: 2021-04-28

## 2021-04-28 RX ADMIN — SODIUM CHLORIDE 50 MILLILITER(S): 9 INJECTION, SOLUTION INTRAVENOUS at 13:21

## 2021-04-28 NOTE — ASU DISCHARGE PLAN (ADULT/PEDIATRIC) - CARE PROVIDER_API CALL
Kristin Leija  OTOLARYNGOLOGY  [address] [place] [id number]  Phone: [phone]  Fax: [phone]  Follow Up Time:

## 2021-04-28 NOTE — ASU DISCHARGE PLAN (ADULT/PEDIATRIC) - ASU DC SPECIAL INSTRUCTIONSFT
Tylenol (325mg) 2 tabs by mouth every 6 hours if needed for pain.  Elevate head of bed to alleviate discomfort.  Follow-up Dr. Leija in 2 weeks; please call office for appointment.

## 2021-04-28 NOTE — ASU PREOP CHECKLIST - COMMENTS
patient was drinking water this am, taking small sips, until 1200 for a total of approximately 7 oz, Dr. Laureano made aware

## 2021-04-28 NOTE — ASU PATIENT PROFILE, ADULT - PMH
BPH (benign prostatic hyperplasia)  no symptoms  Calculus in bladder    COVID-19  Jan 2021- mild case- no antibiotics or hospitalization required  GERD without esophagitis    Heart murmur    Lumbar disc herniation  unsure level  Osteoarthritis    Renal calculi  multiple episodes of bladder and kidney stones (cysteine stones)  Skin cancer  LLE  Varicose veins  bilateral

## 2021-05-04 ENCOUNTER — TRANSCRIPTION ENCOUNTER (OUTPATIENT)
Age: 71
End: 2021-05-04

## 2021-05-05 ENCOUNTER — OUTPATIENT (OUTPATIENT)
Dept: OUTPATIENT SERVICES | Facility: HOSPITAL | Age: 71
LOS: 1 days | End: 2021-05-05
Payer: MEDICARE

## 2021-05-05 ENCOUNTER — RESULT REVIEW (OUTPATIENT)
Age: 71
End: 2021-05-05

## 2021-05-05 VITALS
OXYGEN SATURATION: 98 % | SYSTOLIC BLOOD PRESSURE: 122 MMHG | DIASTOLIC BLOOD PRESSURE: 69 MMHG | TEMPERATURE: 98 F | RESPIRATION RATE: 16 BRPM | HEART RATE: 81 BPM

## 2021-05-05 VITALS
TEMPERATURE: 98 F | HEART RATE: 79 BPM | DIASTOLIC BLOOD PRESSURE: 80 MMHG | RESPIRATION RATE: 16 BRPM | WEIGHT: 226.19 LBS | SYSTOLIC BLOOD PRESSURE: 125 MMHG | HEIGHT: 76 IN

## 2021-05-05 DIAGNOSIS — N21.0 CALCULUS IN BLADDER: ICD-10-CM

## 2021-05-05 DIAGNOSIS — Z90.89 ACQUIRED ABSENCE OF OTHER ORGANS: Chronic | ICD-10-CM

## 2021-05-05 DIAGNOSIS — Z98.890 OTHER SPECIFIED POSTPROCEDURAL STATES: Chronic | ICD-10-CM

## 2021-05-05 PROCEDURE — 88300 SURGICAL PATH GROSS: CPT | Mod: 26

## 2021-05-05 RX ORDER — ONDANSETRON 8 MG/1
4 TABLET, FILM COATED ORAL ONCE
Refills: 0 | Status: DISCONTINUED | OUTPATIENT
Start: 2021-05-05 | End: 2021-05-05

## 2021-05-05 RX ORDER — HYDROMORPHONE HYDROCHLORIDE 2 MG/ML
0.5 INJECTION INTRAMUSCULAR; INTRAVENOUS; SUBCUTANEOUS
Refills: 0 | Status: DISCONTINUED | OUTPATIENT
Start: 2021-05-05 | End: 2021-05-05

## 2021-05-05 RX ORDER — SODIUM CHLORIDE 9 MG/ML
1000 INJECTION, SOLUTION INTRAVENOUS
Refills: 0 | Status: DISCONTINUED | OUTPATIENT
Start: 2021-05-05 | End: 2021-05-05

## 2021-05-05 RX ORDER — NITROFURANTOIN MACROCRYSTAL 50 MG
1 CAPSULE ORAL
Qty: 10 | Refills: 0
Start: 2021-05-05 | End: 2021-05-09

## 2021-05-05 RX ORDER — OXYCODONE AND ACETAMINOPHEN 5; 325 MG/1; MG/1
1 TABLET ORAL
Qty: 12 | Refills: 0
Start: 2021-05-05 | End: 2021-05-07

## 2021-05-05 RX ADMIN — SODIUM CHLORIDE 50 MILLILITER(S): 9 INJECTION, SOLUTION INTRAVENOUS at 15:20

## 2021-05-05 NOTE — BRIEF OPERATIVE NOTE - OPERATION/FINDINGS
Bladder outlet obstruction due to bilobar BPH. Trabeculated bladder. Multiple oval shaped bladder stones. Largest over 2.5 cm in size.    No BT. U.O.'s normal.    Litholapaxy and stone removal done. Hemostasis good.

## 2021-05-05 NOTE — ASU DISCHARGE PLAN (ADULT/PEDIATRIC) - NURSING INSTRUCTIONS
All of discharge instructions including safety pain management and follow up surgeon explained , verbalized undrstanding

## 2021-05-05 NOTE — ASU PREOP CHECKLIST - BOWEL PREP
Attempted to contact regarding last cervical cancer screening and to schedule pap appt via report. No answer. Left VM.   
n/a

## 2021-05-10 PROCEDURE — 52318 REMOVE BLADDER STONE: CPT

## 2021-05-10 PROCEDURE — 88300 SURGICAL PATH GROSS: CPT

## 2021-05-10 PROCEDURE — 82365 CALCULUS SPECTROSCOPY: CPT

## 2021-05-10 PROCEDURE — C1889: CPT

## 2021-05-11 LAB — SURGICAL PATHOLOGY STUDY: SIGNIFICANT CHANGE UP

## 2021-05-14 LAB — NIDUS STONE QN: SIGNIFICANT CHANGE UP

## 2021-06-16 ENCOUNTER — APPOINTMENT (OUTPATIENT)
Dept: RADIOLOGY | Facility: HOSPITAL | Age: 71
End: 2021-06-16

## 2021-06-16 ENCOUNTER — OUTPATIENT (OUTPATIENT)
Dept: OUTPATIENT SERVICES | Facility: HOSPITAL | Age: 71
LOS: 1 days | End: 2021-06-16
Payer: MEDICARE

## 2021-06-16 DIAGNOSIS — Z98.890 OTHER SPECIFIED POSTPROCEDURAL STATES: Chronic | ICD-10-CM

## 2021-06-16 DIAGNOSIS — Z00.8 ENCOUNTER FOR OTHER GENERAL EXAMINATION: ICD-10-CM

## 2021-06-16 DIAGNOSIS — Z90.89 ACQUIRED ABSENCE OF OTHER ORGANS: Chronic | ICD-10-CM

## 2021-06-16 PROBLEM — U07.1 COVID-19: Chronic | Status: ACTIVE | Noted: 2021-02-23

## 2021-06-16 PROCEDURE — 74018 RADEX ABDOMEN 1 VIEW: CPT | Mod: 26

## 2021-06-16 PROCEDURE — 74018 RADEX ABDOMEN 1 VIEW: CPT

## 2021-06-21 ENCOUNTER — OUTPATIENT (OUTPATIENT)
Dept: OUTPATIENT SERVICES | Facility: HOSPITAL | Age: 71
LOS: 1 days | End: 2021-06-21
Payer: MEDICARE

## 2021-06-21 ENCOUNTER — APPOINTMENT (OUTPATIENT)
Dept: CT IMAGING | Facility: HOSPITAL | Age: 71
End: 2021-06-21
Payer: MEDICARE

## 2021-06-21 DIAGNOSIS — Z98.890 OTHER SPECIFIED POSTPROCEDURAL STATES: Chronic | ICD-10-CM

## 2021-06-21 DIAGNOSIS — Z00.8 ENCOUNTER FOR OTHER GENERAL EXAMINATION: ICD-10-CM

## 2021-06-21 DIAGNOSIS — Z90.89 ACQUIRED ABSENCE OF OTHER ORGANS: Chronic | ICD-10-CM

## 2021-06-21 PROCEDURE — 74176 CT ABD & PELVIS W/O CONTRAST: CPT | Mod: MH

## 2021-06-21 PROCEDURE — 74176 CT ABD & PELVIS W/O CONTRAST: CPT | Mod: 26,MH

## 2021-07-16 ENCOUNTER — OUTPATIENT (OUTPATIENT)
Dept: OUTPATIENT SERVICES | Facility: HOSPITAL | Age: 71
LOS: 1 days | End: 2021-07-16
Payer: MEDICARE

## 2021-07-16 ENCOUNTER — APPOINTMENT (OUTPATIENT)
Dept: RADIOLOGY | Facility: HOSPITAL | Age: 71
End: 2021-07-16
Payer: MEDICARE

## 2021-07-16 DIAGNOSIS — Z90.89 ACQUIRED ABSENCE OF OTHER ORGANS: Chronic | ICD-10-CM

## 2021-07-16 DIAGNOSIS — Z98.890 OTHER SPECIFIED POSTPROCEDURAL STATES: Chronic | ICD-10-CM

## 2021-07-16 DIAGNOSIS — Z00.8 ENCOUNTER FOR OTHER GENERAL EXAMINATION: ICD-10-CM

## 2021-07-16 PROCEDURE — 74018 RADEX ABDOMEN 1 VIEW: CPT | Mod: 26

## 2021-07-16 PROCEDURE — 74018 RADEX ABDOMEN 1 VIEW: CPT

## 2021-07-22 ENCOUNTER — OUTPATIENT (OUTPATIENT)
Dept: OUTPATIENT SERVICES | Facility: HOSPITAL | Age: 71
LOS: 1 days | End: 2021-07-22
Payer: MEDICARE

## 2021-07-22 ENCOUNTER — APPOINTMENT (OUTPATIENT)
Dept: ULTRASOUND IMAGING | Facility: HOSPITAL | Age: 71
End: 2021-07-22
Payer: MEDICARE

## 2021-07-22 DIAGNOSIS — Z98.890 OTHER SPECIFIED POSTPROCEDURAL STATES: Chronic | ICD-10-CM

## 2021-07-22 DIAGNOSIS — Z00.8 ENCOUNTER FOR OTHER GENERAL EXAMINATION: ICD-10-CM

## 2021-07-22 DIAGNOSIS — Z90.89 ACQUIRED ABSENCE OF OTHER ORGANS: Chronic | ICD-10-CM

## 2021-07-22 PROCEDURE — 76770 US EXAM ABDO BACK WALL COMP: CPT | Mod: 26

## 2021-07-22 PROCEDURE — 76770 US EXAM ABDO BACK WALL COMP: CPT

## 2021-09-10 LAB
ALBUMIN SERPL ELPH-MCNC: 4 G/DL
ANION GAP SERPL CALC-SCNC: 16 MMOL/L
BUN SERPL-MCNC: 17 MG/DL
CALCIUM SERPL-MCNC: 8.7 MG/DL
CHLORIDE SERPL-SCNC: 100 MMOL/L
CO2 SERPL-SCNC: 26 MMOL/L
CREAT SERPL-MCNC: 0.79 MG/DL
GLUCOSE SERPL-MCNC: 104 MG/DL
PHOSPHATE SERPL-MCNC: 2 MG/DL
POTASSIUM SERPL-SCNC: 3.3 MMOL/L
SODIUM SERPL-SCNC: 142 MMOL/L

## 2022-07-12 ENCOUNTER — OUTPATIENT (OUTPATIENT)
Dept: OUTPATIENT SERVICES | Facility: HOSPITAL | Age: 72
LOS: 1 days | End: 2022-07-12
Payer: MEDICARE

## 2022-07-12 ENCOUNTER — APPOINTMENT (OUTPATIENT)
Dept: RADIOLOGY | Facility: HOSPITAL | Age: 72
End: 2022-07-12

## 2022-07-12 ENCOUNTER — APPOINTMENT (OUTPATIENT)
Dept: ULTRASOUND IMAGING | Facility: HOSPITAL | Age: 72
End: 2022-07-12

## 2022-07-12 DIAGNOSIS — Z98.890 OTHER SPECIFIED POSTPROCEDURAL STATES: Chronic | ICD-10-CM

## 2022-07-12 DIAGNOSIS — Z90.89 ACQUIRED ABSENCE OF OTHER ORGANS: Chronic | ICD-10-CM

## 2022-07-12 DIAGNOSIS — Z00.8 ENCOUNTER FOR OTHER GENERAL EXAMINATION: ICD-10-CM

## 2022-07-12 PROCEDURE — 76770 US EXAM ABDO BACK WALL COMP: CPT | Mod: 26

## 2022-07-12 PROCEDURE — 74018 RADEX ABDOMEN 1 VIEW: CPT | Mod: 26

## 2022-07-12 PROCEDURE — 76770 US EXAM ABDO BACK WALL COMP: CPT

## 2022-07-12 PROCEDURE — 74018 RADEX ABDOMEN 1 VIEW: CPT

## 2022-08-23 ENCOUNTER — LABORATORY RESULT (OUTPATIENT)
Age: 72
End: 2022-08-23

## 2022-08-25 ENCOUNTER — LABORATORY RESULT (OUTPATIENT)
Age: 72
End: 2022-08-25

## 2022-09-01 ENCOUNTER — APPOINTMENT (OUTPATIENT)
Dept: NEPHROLOGY | Facility: CLINIC | Age: 72
End: 2022-09-01

## 2022-09-01 VITALS
OXYGEN SATURATION: 95 % | HEIGHT: 76 IN | WEIGHT: 236.99 LBS | BODY MASS INDEX: 28.86 KG/M2 | TEMPERATURE: 97 F | HEART RATE: 71 BPM | SYSTOLIC BLOOD PRESSURE: 130 MMHG | DIASTOLIC BLOOD PRESSURE: 76 MMHG

## 2022-09-01 PROCEDURE — 99214 OFFICE O/P EST MOD 30 MIN: CPT

## 2022-09-01 NOTE — HISTORY OF PRESENT ILLNESS
[FreeTextEntry1] : Patient previously being managed for cystine stones.  These occurred starting at age 52.  He has required multiple interventional procedures to remove stones.  Previously on Cuprimine but stopped long ago due to price.  Not on other agents.  Was on HCTZ for edema but no longer.  He stated that his past stones were all cystine in composition.  Continues with periodic stone passage.  Current stone burden is 2 stones left, one right and one in the bladder.  Recent stone analysis pending.  He has not been seen in several years, now representing for further stone prevention management.

## 2022-09-01 NOTE — ASSESSMENT
[FreeTextEntry1] : 1. Cystinuria/ cystine stones - repeat collection currently pending.  May need addition of Captopril.  Given prostate issues, reaching a goal of 2.5-3 liters of urine output per day may be difficult.  Can consider addition of potassium citrate dependent upon findings. Most recent stone analysis is pending.  Past compositions are as listed. \par Addendum: stone 7/2/18 - 100% cystine\par                              3/1/19 - 100% cystine\par \par 2. Hypophosphatemia - recent labs showed serum phosphorus of 2.0 which was noted in a lab several years ago.  May represent urinary phosphate wasting.  Await 24 hour urine to assess further.  Might need to review vitamin d-25 and PTH dependent upon findings.  Dietary adjustments may be needed as well.  \par \par Above discussed with patient.  We will review further once 24 hour urine is available next week.

## 2022-09-19 ENCOUNTER — NON-APPOINTMENT (OUTPATIENT)
Age: 72
End: 2022-09-19

## 2022-09-27 ENCOUNTER — LABORATORY RESULT (OUTPATIENT)
Age: 72
End: 2022-09-27

## 2022-10-07 ENCOUNTER — LABORATORY RESULT (OUTPATIENT)
Age: 72
End: 2022-10-07

## 2022-11-03 ENCOUNTER — APPOINTMENT (OUTPATIENT)
Dept: NEPHROLOGY | Facility: CLINIC | Age: 72
End: 2022-11-03

## 2022-11-03 VITALS
HEIGHT: 76 IN | DIASTOLIC BLOOD PRESSURE: 77 MMHG | OXYGEN SATURATION: 97 % | BODY MASS INDEX: 28.86 KG/M2 | WEIGHT: 236.99 LBS | SYSTOLIC BLOOD PRESSURE: 125 MMHG | TEMPERATURE: 97.6 F | HEART RATE: 80 BPM

## 2022-11-03 PROCEDURE — 99214 OFFICE O/P EST MOD 30 MIN: CPT

## 2022-11-03 NOTE — PHYSICAL EXAM
[General Appearance - Alert] : alert [General Appearance - In No Acute Distress] : in no acute distress [General Appearance - Well Nourished] : well nourished [General Appearance - Well Developed] : well developed [General Appearance - Well-Appearing] : healthy appearing [Extraocular Movements] : extraocular movements were intact [Outer Ear] : the ears and nose were normal in appearance [Neck Appearance] : the appearance of the neck was normal [] : the neck was supple [Auscultation Breath Sounds / Voice Sounds] : lungs were clear to auscultation bilaterally [Arterial Pulses Carotid] : carotid pulses were normal with no bruits [Heart Sounds] : normal S1 and S2 [Bowel Sounds] : normal bowel sounds [Abdomen Soft] : soft [No CVA Tenderness] : no ~M costovertebral angle tenderness [Abnormal Walk] : normal gait [Skin Color & Pigmentation] : normal skin color and pigmentation [Sensation] : the sensory exam was normal to light touch and pinprick [Oriented To Time, Place, And Person] : oriented to person, place, and time [Impaired Insight] : insight and judgment were intact [Affect] : the affect was normal [Mood] : the mood was normal [Memory Recent] : recent memory was not impaired [Memory Remote] : remote memory was not impaired

## 2022-11-04 NOTE — HISTORY OF PRESENT ILLNESS
[FreeTextEntry1] : Patient previously being managed for cystine stones.  These occurred starting at age 52.  He has required multiple interventional procedures to remove stones.  Previously on Cuprimine but stopped long ago due to price.  Not on other agents.  Was on HCTZ for edema but no longer.  He stated that his past stones were all cystine in composition.  Continues with periodic stone passage with previous compositions all cystine.  Last stone passed two weeks prior to today's visit.  Also with long term hypophosphatemia and urine collections indicating renal phosphate wasting.

## 2022-11-04 NOTE — ASSESSMENT
[FreeTextEntry1] : 1. Cystinuria/ cystine stones - Repeat collection showing some improvement in urinary cystine.  Needs to further increase fluids to achieve larger urine output, i.e 2-2.5 liters per day.  Supersaturation calculations show similar improvement but not yet at goal.  May still need further urinary alkalinization. Repeat collection prior to next office visit.  Captopril remains a consideration dependent upon clinical course.  Monitor urine calcium which is above goal and causing increased calcium oxalate saturations.  May need to further address if persists on repeat collections.\par \par Addendum: stone 7/2/18 - 100% cystine\par                              3/1/19 - 100% cystine\par \par 2. Hypophosphatemia - urine collections favor renal phosphate wasting as etiology.  PTH slightly elevated at 76 with normal vitamin D 25 levels.  Patient currently seeing Endocrine as well.  Increase phosphate containing foods. \par \par Above discussed with patient.   Follow up 4 months.

## 2022-11-16 ENCOUNTER — APPOINTMENT (OUTPATIENT)
Dept: RADIOLOGY | Facility: HOSPITAL | Age: 72
End: 2022-11-16

## 2022-11-16 ENCOUNTER — APPOINTMENT (OUTPATIENT)
Dept: ULTRASOUND IMAGING | Facility: HOSPITAL | Age: 72
End: 2022-11-16

## 2022-11-29 ENCOUNTER — OUTPATIENT (OUTPATIENT)
Dept: OUTPATIENT SERVICES | Facility: HOSPITAL | Age: 72
LOS: 1 days | End: 2022-11-29
Payer: MEDICARE

## 2022-11-29 ENCOUNTER — APPOINTMENT (OUTPATIENT)
Dept: ULTRASOUND IMAGING | Facility: CLINIC | Age: 72
End: 2022-11-29

## 2022-11-29 ENCOUNTER — APPOINTMENT (OUTPATIENT)
Dept: RADIOLOGY | Facility: CLINIC | Age: 72
End: 2022-11-29

## 2022-11-29 DIAGNOSIS — Z00.8 ENCOUNTER FOR OTHER GENERAL EXAMINATION: ICD-10-CM

## 2022-11-29 PROCEDURE — 74018 RADEX ABDOMEN 1 VIEW: CPT | Mod: 26

## 2022-11-29 PROCEDURE — 76770 US EXAM ABDO BACK WALL COMP: CPT | Mod: 26

## 2022-11-29 PROCEDURE — 76770 US EXAM ABDO BACK WALL COMP: CPT

## 2022-11-29 PROCEDURE — 74018 RADEX ABDOMEN 1 VIEW: CPT

## 2022-12-12 DIAGNOSIS — K59.00 CONSTIPATION, UNSPECIFIED: ICD-10-CM

## 2022-12-12 DIAGNOSIS — R14.3 FLATULENCE: ICD-10-CM

## 2022-12-12 DIAGNOSIS — N40.0 BENIGN PROSTATIC HYPERPLASIA WITHOUT LOWER URINARY TRACT SYMPTOMS: ICD-10-CM

## 2022-12-12 DIAGNOSIS — N20.0 CALCULUS OF KIDNEY: ICD-10-CM

## 2022-12-12 DIAGNOSIS — N21.0 CALCULUS IN BLADDER: ICD-10-CM

## 2023-05-08 ENCOUNTER — APPOINTMENT (OUTPATIENT)
Dept: SURGERY | Facility: CLINIC | Age: 73
End: 2023-05-08
Payer: MEDICARE

## 2023-05-08 VITALS — BODY MASS INDEX: 27.87 KG/M2 | HEIGHT: 77 IN | TEMPERATURE: 97.5 F | WEIGHT: 236 LBS

## 2023-05-08 DIAGNOSIS — Z87.440 PERSONAL HISTORY OF URINARY (TRACT) INFECTIONS: ICD-10-CM

## 2023-05-08 DIAGNOSIS — K21.9 GASTRO-ESOPHAGEAL REFLUX DISEASE W/OUT ESOPHAGITIS: ICD-10-CM

## 2023-05-08 PROCEDURE — 99203 OFFICE O/P NEW LOW 30 MIN: CPT

## 2023-05-08 NOTE — PHYSICAL EXAM
[Normal Breath Sounds] : Normal breath sounds [Normal Heart Sounds] : normal heart sounds [Normal Rate and Rhythm] : normal rate and rhythm [Abdominal Masses] : No abdominal masses [Abdomen Tenderness] : ~T ~M No abdominal tenderness [No Rash or Lesion] : No rash or lesion [de-identified] : nl [de-identified] : nl [de-identified] : reducible UH [de-identified] : nl

## 2023-05-08 NOTE — ASSESSMENT
[FreeTextEntry1] : Long discussion regarding all options and risks\par To undergo repair of UH on 6/8/23\par Discussed with Medicine\par All lab values and imaging studies reviewed

## 2023-05-25 ENCOUNTER — OUTPATIENT (OUTPATIENT)
Dept: OUTPATIENT SERVICES | Facility: HOSPITAL | Age: 73
LOS: 1 days | End: 2023-05-25
Payer: MEDICARE

## 2023-05-25 VITALS
RESPIRATION RATE: 18 BRPM | HEART RATE: 92 BPM | WEIGHT: 235.01 LBS | HEIGHT: 78 IN | DIASTOLIC BLOOD PRESSURE: 77 MMHG | TEMPERATURE: 98 F | OXYGEN SATURATION: 97 % | SYSTOLIC BLOOD PRESSURE: 134 MMHG

## 2023-05-25 DIAGNOSIS — Z98.890 OTHER SPECIFIED POSTPROCEDURAL STATES: Chronic | ICD-10-CM

## 2023-05-25 DIAGNOSIS — K42.9 UMBILICAL HERNIA WITHOUT OBSTRUCTION OR GANGRENE: ICD-10-CM

## 2023-05-25 DIAGNOSIS — K21.9 GASTRO-ESOPHAGEAL REFLUX DISEASE WITHOUT ESOPHAGITIS: ICD-10-CM

## 2023-05-25 DIAGNOSIS — Z01.818 ENCOUNTER FOR OTHER PREPROCEDURAL EXAMINATION: ICD-10-CM

## 2023-05-25 DIAGNOSIS — Z90.89 ACQUIRED ABSENCE OF OTHER ORGANS: Chronic | ICD-10-CM

## 2023-05-25 LAB
ANION GAP SERPL CALC-SCNC: 7 MMOL/L — SIGNIFICANT CHANGE UP (ref 5–17)
BUN SERPL-MCNC: 19 MG/DL — SIGNIFICANT CHANGE UP (ref 7–23)
CALCIUM SERPL-MCNC: 9.2 MG/DL — SIGNIFICANT CHANGE UP (ref 8.4–10.5)
CHLORIDE SERPL-SCNC: 107 MMOL/L — SIGNIFICANT CHANGE UP (ref 96–108)
CO2 SERPL-SCNC: 27 MMOL/L — SIGNIFICANT CHANGE UP (ref 22–31)
CREAT SERPL-MCNC: 1.02 MG/DL — SIGNIFICANT CHANGE UP (ref 0.5–1.3)
EGFR: 79 ML/MIN/1.73M2 — SIGNIFICANT CHANGE UP
GLUCOSE SERPL-MCNC: 99 MG/DL — SIGNIFICANT CHANGE UP (ref 70–99)
HCT VFR BLD CALC: 49.1 % — SIGNIFICANT CHANGE UP (ref 39–50)
HGB BLD-MCNC: 16.7 G/DL — SIGNIFICANT CHANGE UP (ref 13–17)
MCHC RBC-ENTMCNC: 32.6 PG — SIGNIFICANT CHANGE UP (ref 27–34)
MCHC RBC-ENTMCNC: 34 GM/DL — SIGNIFICANT CHANGE UP (ref 32–36)
MCV RBC AUTO: 95.9 FL — SIGNIFICANT CHANGE UP (ref 80–100)
NRBC # BLD: 0 /100 WBCS — SIGNIFICANT CHANGE UP (ref 0–0)
PLATELET # BLD AUTO: 203 K/UL — SIGNIFICANT CHANGE UP (ref 150–400)
POTASSIUM SERPL-MCNC: 4.3 MMOL/L — SIGNIFICANT CHANGE UP (ref 3.5–5.3)
POTASSIUM SERPL-SCNC: 4.3 MMOL/L — SIGNIFICANT CHANGE UP (ref 3.5–5.3)
RBC # BLD: 5.12 M/UL — SIGNIFICANT CHANGE UP (ref 4.2–5.8)
RBC # FLD: 12.7 % — SIGNIFICANT CHANGE UP (ref 10.3–14.5)
SODIUM SERPL-SCNC: 141 MMOL/L — SIGNIFICANT CHANGE UP (ref 135–145)
WBC # BLD: 6.08 K/UL — SIGNIFICANT CHANGE UP (ref 3.8–10.5)
WBC # FLD AUTO: 6.08 K/UL — SIGNIFICANT CHANGE UP (ref 3.8–10.5)

## 2023-05-25 PROCEDURE — 80048 BASIC METABOLIC PNL TOTAL CA: CPT

## 2023-05-25 PROCEDURE — G0463: CPT

## 2023-05-25 PROCEDURE — 36415 COLL VENOUS BLD VENIPUNCTURE: CPT

## 2023-05-25 PROCEDURE — 93005 ELECTROCARDIOGRAM TRACING: CPT

## 2023-05-25 PROCEDURE — 93010 ELECTROCARDIOGRAM REPORT: CPT | Mod: NC

## 2023-05-25 PROCEDURE — 85027 COMPLETE CBC AUTOMATED: CPT

## 2023-05-25 RX ORDER — ACETAMINOPHEN 500 MG
2 TABLET ORAL
Qty: 0 | Refills: 0 | DISCHARGE

## 2023-05-25 RX ORDER — TAMSULOSIN HYDROCHLORIDE 0.4 MG/1
1 CAPSULE ORAL
Qty: 0 | Refills: 0 | DISCHARGE

## 2023-05-25 NOTE — H&P PST ADULT - NSICDXPASTSURGICALHX_GEN_ALL_CORE_FT
PAST SURGICAL HISTORY:  H/O lithotripsy x4    H/O varicose vein ligation     S/P colonoscopy 2018, no polyps    S/P cystoscopy 2015 bladder stone extraction    S/P T&A (status post tonsillectomy and adenoidectomy) as child     PAST SURGICAL HISTORY:  H/O lithotripsy x4    H/O varicose vein ligation     History of prostate surgery     S/P colonoscopy 2018, no polyps    S/P cystoscopy 2015 bladder stone extraction    S/P T&A (status post tonsillectomy and adenoidectomy) as child

## 2023-05-25 NOTE — H&P PST ADULT - PROBLEM SELECTOR PLAN 1
Scheduled for umbilical hernia repair with Dr Lane on 06/08/2023.  Pre op instructions given and patient verbalized understanding.  CBC, BMP, EKG and medical clearance pending.  NPO after midnight night before procedure.  May take famotidine with small sip of water AM of procedure.  To stop all ASA, NSAIDs, vitamins and supplements 1 week prior to procedure.  Chlorhexidene wash given with instructions.  MARCI precautions STOP BANG 4  IS given with teaching

## 2023-05-25 NOTE — H&P PST ADULT - NSHP PST SURGERY DATE_DT_GEN_A_CORE
What Stage Is The Melanoma?: Stage 0 What Is The Reason For Today's Visit?: Follow Up Melanoma Year Excised?: 2006 08-Jun-2023

## 2023-05-25 NOTE — H&P PST ADULT - NSANTHOSAYNRD_GEN_A_CORE
neck 18 inches/No. MARCI screening performed.  STOP BANG Legend: 0-2 = LOW Risk; 3-4 = INTERMEDIATE Risk; 5-8 = HIGH Risk

## 2023-05-25 NOTE — H&P PST ADULT - HISTORY OF PRESENT ILLNESS
73 y/o male with PMH of GERD and BPH presents for PST.  C/O mild abdominal mass that was evaluated and dx with umbilical hernia and recommended for surgical repair.  Denies N/V/D or constipation and other wise feeling well at PST today.  Scheduled for umbilical hernia repair with Dr Laen on 06/08/2023.

## 2023-05-25 NOTE — H&P PST ADULT - GENITOURINARY COMMENTS
hx BPH, renal stones with lithotripsy 4x - last procedure approx.. 1 year ago - follows with urology 2-3x a year not examined

## 2023-05-25 NOTE — H&P PST ADULT - MUSCULOSKELETAL COMMENTS
currently getting treatment for varicose veins - has 3 treatments left - wearing compression stockings bilateral lower extremities bilateral lower extremity compression socks in place

## 2023-05-25 NOTE — H&P PST ADULT - ASSESSMENT
Conjuntivae and eyelids appear normal,  Sclerae : White without injection
Umbilical hernia without obstruction or gangrene

## 2023-05-25 NOTE — H&P PST ADULT - TEMPERATURE IN FAHRENHEIT (DEGREES F)
EMR, Laboratory and Radiology results reviewed with Right knee Xray result of Soft tissue swelling medially. Mild to moderate degeneration. No fracture./yes 98.3

## 2023-06-07 ENCOUNTER — TRANSCRIPTION ENCOUNTER (OUTPATIENT)
Age: 73
End: 2023-06-07

## 2023-06-08 ENCOUNTER — APPOINTMENT (OUTPATIENT)
Dept: SURGERY | Facility: HOSPITAL | Age: 73
End: 2023-06-08

## 2023-06-08 ENCOUNTER — TRANSCRIPTION ENCOUNTER (OUTPATIENT)
Age: 73
End: 2023-06-08

## 2023-06-08 ENCOUNTER — RESULT REVIEW (OUTPATIENT)
Age: 73
End: 2023-06-08

## 2023-06-08 ENCOUNTER — OUTPATIENT (OUTPATIENT)
Dept: OUTPATIENT SERVICES | Facility: HOSPITAL | Age: 73
LOS: 1 days | End: 2023-06-08
Payer: MEDICARE

## 2023-06-08 VITALS
DIASTOLIC BLOOD PRESSURE: 71 MMHG | TEMPERATURE: 98 F | SYSTOLIC BLOOD PRESSURE: 128 MMHG | HEART RATE: 74 BPM | OXYGEN SATURATION: 96 % | RESPIRATION RATE: 16 BRPM | HEIGHT: 78 IN | WEIGHT: 235.01 LBS

## 2023-06-08 VITALS
RESPIRATION RATE: 16 BRPM | OXYGEN SATURATION: 96 % | HEART RATE: 74 BPM | TEMPERATURE: 97 F | SYSTOLIC BLOOD PRESSURE: 130 MMHG | DIASTOLIC BLOOD PRESSURE: 78 MMHG

## 2023-06-08 DIAGNOSIS — K42.9 UMBILICAL HERNIA WITHOUT OBSTRUCTION OR GANGRENE: ICD-10-CM

## 2023-06-08 DIAGNOSIS — Z98.890 OTHER SPECIFIED POSTPROCEDURAL STATES: Chronic | ICD-10-CM

## 2023-06-08 DIAGNOSIS — Z90.89 ACQUIRED ABSENCE OF OTHER ORGANS: Chronic | ICD-10-CM

## 2023-06-08 PROCEDURE — 49594 RPR AA HRN 1ST 3-10 NCR/STRN: CPT

## 2023-06-08 PROCEDURE — 88305 TISSUE EXAM BY PATHOLOGIST: CPT | Mod: 26

## 2023-06-08 PROCEDURE — C9399: CPT

## 2023-06-08 PROCEDURE — 49505 PRP I/HERN INIT REDUC >5 YR: CPT | Mod: AS

## 2023-06-08 PROCEDURE — 88305 TISSUE EXAM BY PATHOLOGIST: CPT

## 2023-06-08 PROCEDURE — 49594 RPR AA HRN 1ST 3-10 NCR/STRN: CPT | Mod: AS

## 2023-06-08 RX ORDER — OXYCODONE HYDROCHLORIDE 5 MG/1
1 TABLET ORAL
Qty: 10 | Refills: 0
Start: 2023-06-08

## 2023-06-08 RX ORDER — SODIUM CHLORIDE 9 MG/ML
1000 INJECTION, SOLUTION INTRAVENOUS
Refills: 0 | Status: DISCONTINUED | OUTPATIENT
Start: 2023-06-08 | End: 2023-06-08

## 2023-06-08 RX ORDER — OXYCODONE HYDROCHLORIDE 5 MG/1
5 TABLET ORAL ONCE
Refills: 0 | Status: DISCONTINUED | OUTPATIENT
Start: 2023-06-08 | End: 2023-06-22

## 2023-06-08 RX ORDER — PANTOPRAZOLE SODIUM 20 MG/1
1 TABLET, DELAYED RELEASE ORAL
Qty: 0 | Refills: 0 | DISCHARGE

## 2023-06-08 RX ORDER — OXYCODONE HYDROCHLORIDE 5 MG/1
1 TABLET ORAL
Qty: 10 | Refills: 0 | DISCHARGE
Start: 2023-06-08

## 2023-06-08 RX ADMIN — SODIUM CHLORIDE 50 MILLILITER(S): 9 INJECTION, SOLUTION INTRAVENOUS at 06:46

## 2023-06-08 NOTE — ASU PATIENT PROFILE, ADULT - FALL HARM RISK - UNIVERSAL INTERVENTIONS
Bed in lowest position, wheels locked, appropriate side rails in place/Call bell, personal items and telephone in reach/Instruct patient to call for assistance before getting out of bed or chair/Non-slip footwear when patient is out of bed/[place] to call system/Physically safe environment - no spills, clutter or unnecessary equipment/Purposeful Proactive Rounding/Room/bathroom lighting operational, light cord in reach

## 2023-06-08 NOTE — ASU PATIENT PROFILE, ADULT - NSICDXPASTSURGICALHX_GEN_ALL_CORE_FT
PAST SURGICAL HISTORY:  H/O lithotripsy x4    H/O varicose vein ligation     History of prostate surgery     S/P colonoscopy 2018, no polyps    S/P cystoscopy 2015 bladder stone extraction    S/P T&A (status post tonsillectomy and adenoidectomy) as child

## 2023-06-08 NOTE — BRIEF OPERATIVE NOTE - NSICDXBRIEFPROCEDURE_GEN_ALL_CORE_FT
PROCEDURES:  Repair, hernia, umbilical, with lipectomy 08-Jun-2023 09:00:29 umbilical hernia Leonadro Lao

## 2023-06-08 NOTE — ASU DISCHARGE PLAN (ADULT/PEDIATRIC) - CARE PROVIDER_API CALL
Arun Lane.  Surgery  [address] [facility], [address]  [place], [place] [id number]  Phone: [phone]  Fax: [phone]  Follow Up Time: 1 week

## 2023-06-08 NOTE — ASU DISCHARGE PLAN (ADULT/PEDIATRIC) - NURSING INSTRUCTIONS
Keep incision clean and dry avoid touching area. Do not remove steri strips. Increase fluids to avoid constipation and dehydration. gradually increase activity as tolerated

## 2023-06-08 NOTE — ASU DISCHARGE PLAN (ADULT/PEDIATRIC) - NS MD DC FALL RISK RISK
For information on Fall & Injury Prevention, visit: [email or web address] OR  [email or web address] OR  https://www.cdc.gov/steadi/patient.html

## 2023-06-08 NOTE — ASU DISCHARGE PLAN (ADULT/PEDIATRIC) - ASU DC SPECIAL INSTRUCTIONSFT
Postop pain medications will be prescribed.  Can take Gas and constipation medications: Colace is a stool softener and may be prescribed or bought  over the counter and should be used until bowel movements are back to normal. GasX or Mylanta  are for gas pain and can be used for gas pain. Gas X or Mylanta is over the counter medication.  Bathing: You may take a bath or shower unless otherwise told not to take a bath. It is fine to get the  incisions wet.  Constipation: Surgery, anesthesia and narcotics cause constipation. Over the counter remedies for this  includes Colace 100-200 mg twice daily, Metamucil, Milk of Magnesia 2 teaspoons every 4 hours as  needed, or Dulcolax suppositories. Stool softeners and laxatives may be necessary for quite some time  so use them as long as needed. Keep well hydrated as this also helps to reduce constipation. Dried fruit  such as prunes are also an effective natural remedy for constipation. .  Exercise: Exercise like walking is encouraged during your recovery, however, you will not be able to  resume strenuous exercise until cleared by your doctor. Avoid high impact sports such as aerobics,  running, tennis and heavy weight lifting until your first postop appointment (no more than 5 pounds).  Gradually build your strength back by walking and non- impact sports.  Pain is expected after surgery. Do take the pain meds you were prescribed as needed.  anded to the patient rather than called in. Postop pain medications will be prescribed.  Can take Gas and constipation medications: Colace is a stool softener and may be prescribed or bought  over the counter and should be used until bowel movements are back to normal. GasX or Mylanta  are for gas pain and can be used for gas pain. Gas X or Mylanta is over the counter medication.  Bathing: You may take a bath or shower unless otherwise told not to take a bath. It is fine to get the  incisions wet.  Constipation: Surgery, anesthesia and narcotics cause constipation. Over the counter remedies for this  includes Colace 100-200 mg twice daily, Metamucil, Milk of Magnesia 2 teaspoons every 4 hours as  needed, or Dulcolax suppositories. Stool softeners and laxatives may be necessary for quite some time  so use them as long as needed. Keep well hydrated as this also helps to reduce constipation. Dried fruit  such as prunes are also an effective natural remedy for constipation. .  Exercise: Exercise like walking is encouraged during your recovery, however, you will not be able to  resume strenuous exercise until cleared by your doctor. Avoid high impact sports such as aerobics,  running, tennis and heavy weight lifting until your first postop appointment (no more than 5 pounds).  Gradually build your strength back by walking and non- impact sports.  Pain is expected after surgery. Do take the pain meds you were prescribed as needed.  handed to the patient rather than called in.

## 2023-06-19 LAB — SURGICAL PATHOLOGY STUDY: SIGNIFICANT CHANGE UP

## 2023-06-23 ENCOUNTER — APPOINTMENT (OUTPATIENT)
Dept: SURGERY | Facility: CLINIC | Age: 73
End: 2023-06-23
Payer: MEDICARE

## 2023-06-23 VITALS — TEMPERATURE: 97.9 F | WEIGHT: 220 LBS | HEIGHT: 78 IN | BODY MASS INDEX: 25.45 KG/M2

## 2023-06-23 DIAGNOSIS — K42.9 UMBILICAL HERNIA W/OUT OBSTRUCTION OR GANGRENE: ICD-10-CM

## 2023-06-23 PROCEDURE — 99024 POSTOP FOLLOW-UP VISIT: CPT

## 2023-06-23 NOTE — PHYSICAL EXAM
[Abdominal Masses] : No abdominal masses [Abdomen Tenderness] : ~T ~M No abdominal tenderness [de-identified] : Wound healing well

## 2023-06-29 LAB
ALBUMIN SERPL ELPH-MCNC: 4.2 G/DL
ANION GAP SERPL CALC-SCNC: 10 MMOL/L
BUN SERPL-MCNC: 16 MG/DL
CALCIUM SERPL-MCNC: 9.7 MG/DL
CHLORIDE SERPL-SCNC: 110 MMOL/L
CO2 SERPL-SCNC: 24 MMOL/L
CREAT SERPL-MCNC: 0.91 MG/DL
EGFR: 91 ML/MIN/1.73M2
GLUCOSE SERPL-MCNC: 100 MG/DL
PHOSPHATE SERPL-MCNC: 2.3 MG/DL
POTASSIUM SERPL-SCNC: 4.1 MMOL/L
SODIUM SERPL-SCNC: 144 MMOL/L

## 2023-07-12 LAB
STONE PLEASE NOTE:: NORMAL
STONE REVIEW: NORMAL
STONE, AMMONIUM 24 HR URINE: 28
STONE, AMMONIUM URINE: NORMAL UG/DL
STONE, BRUSHITE: 2.03 RATIO
STONE, CALCIUM 24 HR URINE: 390.2 MG/24 HR
STONE, CALCIUM OXALATE: 9.29 RATIO
STONE, CALCIUM URINE: 28.9 MG/DL
STONE, CHLORIDE 24 HR URINE: 151
STONE, CHLORIDE: 112 MMOL/L
STONE, CITRATE 24 HR URINE: 714 MG/24 HR
STONE, CITRATE URINE: 529 MG/L
STONE, CREATININE 24 HR URINE: 2037.2 MG/24 HR
STONE, CREATININE URINE: 150.9 MG/DL
STONE, CYSTINE 24 HR URINE: 423.93 MG/24 HR
STONE, CYSTINE URINE: 314.02 MG/L
STONE, MAGNESIUM 24 HR URINE: 143 MG/24 HR
STONE, MAGNESIUM URINE: 10.6 MG/DL
STONE, OSMOLALITY: 755
STONE, OXALATE 24 HR URINE: 26 MG/24 HR
STONE, OXALATE URINE: 19 MG/L
STONE, PH URINE: 5.6
STONE, PHOSPHOROUS URINE: 75.9 MG/DL
STONE, PHOSPHORUS 24 HR URINE: 1024.7 MG/24 HR
STONE, POTASSIUM 24 HR URINE: 56.3
STONE, POTASSIUM URINE: 41.7 MMOL/L
STONE, SODIUM 24 HR URINE: 176
STONE, SODIUM URATE: 4.94 RATIO
STONE, SODIUM URINE: 130 MMOL/L
STONE, STRUVITE: 0.01 RATIO
STONE, SULFATE 24 HR URINE: 35
STONE, SULFATE URINE: 26 MEQ/L
STONE, URIC ACID 24 HR URINE: 635 MG/24 HR
STONE, URIC ACID URINE: 3.8 RATIO
STONE, URIC ACID URINE: 47 MG/DL
TOT VOL (PRESERVATIVE): 1350 ML/24 HR
TOTAL VOLUME URINE: 1350 ML/24 HR

## 2023-07-13 ENCOUNTER — APPOINTMENT (OUTPATIENT)
Dept: NEPHROLOGY | Facility: CLINIC | Age: 73
End: 2023-07-13
Payer: MEDICARE

## 2023-07-13 VITALS
HEART RATE: 82 BPM | TEMPERATURE: 97.6 F | BODY MASS INDEX: 26.84 KG/M2 | HEIGHT: 78 IN | SYSTOLIC BLOOD PRESSURE: 132 MMHG | DIASTOLIC BLOOD PRESSURE: 69 MMHG | WEIGHT: 232 LBS | OXYGEN SATURATION: 95 %

## 2023-07-13 DIAGNOSIS — R82.994 HYPERCALCIURIA: ICD-10-CM

## 2023-07-13 DIAGNOSIS — E83.39 OTHER DISORDERS OF PHOSPHORUS METABOLISM: ICD-10-CM

## 2023-07-13 DIAGNOSIS — E72.01 CYSTINURIA: ICD-10-CM

## 2023-07-13 PROCEDURE — 99214 OFFICE O/P EST MOD 30 MIN: CPT

## 2023-07-13 RX ORDER — PANTOPRAZOLE 40 MG/1
40 TABLET, DELAYED RELEASE ORAL
Qty: 90 | Refills: 3 | Status: DISCONTINUED | COMMUNITY
Start: 2017-01-10 | End: 2023-07-13

## 2023-07-13 RX ORDER — FAMOTIDINE 20 MG/1
20 TABLET, FILM COATED ORAL DAILY
Refills: 0 | Status: ACTIVE | COMMUNITY
Start: 2023-07-13

## 2023-07-13 NOTE — ASSESSMENT
[FreeTextEntry1] : 1. Cystinuria/ cystine stones - Repeat collection showing elevation in urinary cystine to 314 mg/Liter.  Goal is less than 250 mg/L.  Needs to further increase fluids to achieve larger urine output, i.e 2.5 liters per day.  May still need further urinary alkalinization. For now will add lemon juice regularly to water.  May need addition of Urocit K if needed.  Repeat collection prior to next office visit.  Captopril or Thiola remains a consideration dependent upon clinical course.  \par \par 2. Hypophosphatemia - urine collections favor renal phosphate wasting as etiology.  PTH slightly elevated at 75 with normal vitamin D 25 levels.  Patient currently seeing Endocrine as well.  Increase phosphate containing foods. Most recent phosphorus level was marginally improved to 2.3.  Repeat prior to next visit. \par \par 3. Hypercalciuria - He will hold vitamin D and will reassess with next collection.  May need HCTZ or other agent if urinary calcium remains high.  Currently at 28.9 mg/dl.  This, along with pH 5.6, is contributing to elevated calcium oxalate saturation kinetics. \par \par Above discussed with patient.   Follow up 3 months.

## 2023-07-13 NOTE — HISTORY OF PRESENT ILLNESS
[FreeTextEntry1] : Patient previously being managed for cystine stones.  These occurred starting at age 52.  He has required multiple interventional procedures to remove stones.  Previously on Cuprimine but stopped long ago due to price.  Not on other agents.  Was on HCTZ for edema but no longer.  He stated that his past stones were all cystine in composition.  Also with long term hypophosphatemia and urine collections indicating renal phosphate wasting.  Denied recent stone passage.

## 2023-07-24 ENCOUNTER — NON-APPOINTMENT (OUTPATIENT)
Age: 73
End: 2023-07-24

## 2023-07-24 ENCOUNTER — APPOINTMENT (OUTPATIENT)
Dept: CARDIOLOGY | Facility: CLINIC | Age: 73
End: 2023-07-24
Payer: MEDICARE

## 2023-07-24 VITALS — SYSTOLIC BLOOD PRESSURE: 130 MMHG | DIASTOLIC BLOOD PRESSURE: 70 MMHG | HEART RATE: 76 BPM

## 2023-07-24 VITALS
HEART RATE: 73 BPM | HEIGHT: 78 IN | BODY MASS INDEX: 27.19 KG/M2 | RESPIRATION RATE: 17 BRPM | OXYGEN SATURATION: 97 % | WEIGHT: 235 LBS

## 2023-07-24 DIAGNOSIS — Z00.00 ENCOUNTER FOR GENERAL ADULT MEDICAL EXAMINATION W/OUT ABNORMAL FINDINGS: ICD-10-CM

## 2023-07-24 PROCEDURE — 99204 OFFICE O/P NEW MOD 45 MIN: CPT

## 2023-07-24 PROCEDURE — 93000 ELECTROCARDIOGRAM COMPLETE: CPT

## 2023-07-24 RX ORDER — OXYCODONE AND ACETAMINOPHEN 5; 325 MG/1; MG/1
5-325 TABLET ORAL
Qty: 30 | Refills: 0 | Status: DISCONTINUED | COMMUNITY
Start: 2018-06-11 | End: 2023-07-24

## 2023-07-24 NOTE — ASSESSMENT
[FreeTextEntry1] : In summary, the patient is a 72-year-old male with increasing dyspnea on exertion over the past year.\par \par For now we will have him return for echocardiography to rule out any cardiomyopathy as well as exercise stress testing to assess for underlying ischemia.\par \par Depending on the results of these tests further recommendations may be forthcoming

## 2023-07-24 NOTE — REASON FOR VISIT
[Symptom and Test Evaluation] : symptom and test evaluation [FreeTextEntry1] : This is a 72-year-old man who presents for cardiac evaluation.  The patient has noted for the last 6 months to year that he gets more short of breath with less exertion.  Of note is the fact that he has had similar symptoms in the remote past and has had negative work-up including a negative cardiac CTA in 2017.  He is a non-smoker.  He has no history of significant alcohol use.  He has no history of hypertension or diabetes.  He states he recently had blood work and there was no significant hyperlipidemia.  There is no family history of heart disease.  Of note is the fact that the patient may have had some asbestos exposure when employed by the [employer]

## 2023-07-26 ENCOUNTER — APPOINTMENT (OUTPATIENT)
Dept: CARDIOLOGY | Facility: CLINIC | Age: 73
End: 2023-07-26
Payer: MEDICARE

## 2023-07-26 PROCEDURE — 93306 TTE W/DOPPLER COMPLETE: CPT

## 2023-07-29 ENCOUNTER — EMERGENCY (EMERGENCY)
Facility: HOSPITAL | Age: 73
LOS: 1 days | Discharge: HOME | End: 2023-07-29
Attending: EMERGENCY MEDICINE | Admitting: EMERGENCY MEDICINE
Payer: MEDICARE

## 2023-07-29 VITALS
HEIGHT: 78 IN | TEMPERATURE: 99 F | WEIGHT: 229.94 LBS | SYSTOLIC BLOOD PRESSURE: 173 MMHG | HEART RATE: 95 BPM | DIASTOLIC BLOOD PRESSURE: 79 MMHG | RESPIRATION RATE: 18 BRPM | OXYGEN SATURATION: 99 %

## 2023-07-29 VITALS
OXYGEN SATURATION: 98 % | SYSTOLIC BLOOD PRESSURE: 136 MMHG | RESPIRATION RATE: 20 BRPM | HEART RATE: 97 BPM | TEMPERATURE: 98 F | DIASTOLIC BLOOD PRESSURE: 78 MMHG

## 2023-07-29 DIAGNOSIS — Z98.890 OTHER SPECIFIED POSTPROCEDURAL STATES: Chronic | ICD-10-CM

## 2023-07-29 DIAGNOSIS — Z90.89 ACQUIRED ABSENCE OF OTHER ORGANS: Chronic | ICD-10-CM

## 2023-07-29 LAB
ALBUMIN SERPL ELPH-MCNC: 4 G/DL — SIGNIFICANT CHANGE UP (ref 3.3–5)
ALP SERPL-CCNC: 68 U/L — SIGNIFICANT CHANGE UP (ref 40–120)
ALT FLD-CCNC: 17 U/L — SIGNIFICANT CHANGE UP (ref 10–45)
ANION GAP SERPL CALC-SCNC: 12 MMOL/L — SIGNIFICANT CHANGE UP (ref 5–17)
AST SERPL-CCNC: 18 U/L — SIGNIFICANT CHANGE UP (ref 10–40)
BASOPHILS # BLD AUTO: 0.03 K/UL — SIGNIFICANT CHANGE UP (ref 0–0.2)
BASOPHILS NFR BLD AUTO: 0.3 % — SIGNIFICANT CHANGE UP (ref 0–2)
BILIRUB SERPL-MCNC: 0.6 MG/DL — SIGNIFICANT CHANGE UP (ref 0.2–1.2)
BUN SERPL-MCNC: 24 MG/DL — HIGH (ref 7–23)
CALCIUM SERPL-MCNC: 9.7 MG/DL — SIGNIFICANT CHANGE UP (ref 8.4–10.5)
CHLORIDE SERPL-SCNC: 108 MMOL/L — SIGNIFICANT CHANGE UP (ref 96–108)
CO2 SERPL-SCNC: 22 MMOL/L — SIGNIFICANT CHANGE UP (ref 22–31)
CREAT SERPL-MCNC: 1.37 MG/DL — HIGH (ref 0.5–1.3)
EGFR: 55 ML/MIN/1.73M2 — LOW
EOSINOPHIL # BLD AUTO: 0.05 K/UL — SIGNIFICANT CHANGE UP (ref 0–0.5)
EOSINOPHIL NFR BLD AUTO: 0.5 % — SIGNIFICANT CHANGE UP (ref 0–6)
GLUCOSE SERPL-MCNC: 139 MG/DL — HIGH (ref 70–99)
HCT VFR BLD CALC: 47.3 % — SIGNIFICANT CHANGE UP (ref 39–50)
HGB BLD-MCNC: 16.1 G/DL — SIGNIFICANT CHANGE UP (ref 13–17)
IMM GRANULOCYTES NFR BLD AUTO: 0.6 % — SIGNIFICANT CHANGE UP (ref 0–0.9)
LYMPHOCYTES # BLD AUTO: 1.03 K/UL — SIGNIFICANT CHANGE UP (ref 1–3.3)
LYMPHOCYTES # BLD AUTO: 9.8 % — LOW (ref 13–44)
MCHC RBC-ENTMCNC: 32.1 PG — SIGNIFICANT CHANGE UP (ref 27–34)
MCHC RBC-ENTMCNC: 34 GM/DL — SIGNIFICANT CHANGE UP (ref 32–36)
MCV RBC AUTO: 94.2 FL — SIGNIFICANT CHANGE UP (ref 80–100)
MONOCYTES # BLD AUTO: 0.56 K/UL — SIGNIFICANT CHANGE UP (ref 0–0.9)
MONOCYTES NFR BLD AUTO: 5.3 % — SIGNIFICANT CHANGE UP (ref 2–14)
NEUTROPHILS # BLD AUTO: 8.74 K/UL — HIGH (ref 1.8–7.4)
NEUTROPHILS NFR BLD AUTO: 83.5 % — HIGH (ref 43–77)
NRBC # BLD: 0 /100 WBCS — SIGNIFICANT CHANGE UP (ref 0–0)
PLATELET # BLD AUTO: 221 K/UL — SIGNIFICANT CHANGE UP (ref 150–400)
POTASSIUM SERPL-MCNC: 4 MMOL/L — SIGNIFICANT CHANGE UP (ref 3.5–5.3)
POTASSIUM SERPL-SCNC: 4 MMOL/L — SIGNIFICANT CHANGE UP (ref 3.5–5.3)
PROT SERPL-MCNC: 7.2 G/DL — SIGNIFICANT CHANGE UP (ref 6–8.3)
RBC # BLD: 5.02 M/UL — SIGNIFICANT CHANGE UP (ref 4.2–5.8)
RBC # FLD: 12.8 % — SIGNIFICANT CHANGE UP (ref 10.3–14.5)
SODIUM SERPL-SCNC: 142 MMOL/L — SIGNIFICANT CHANGE UP (ref 135–145)
WBC # BLD: 10.47 K/UL — SIGNIFICANT CHANGE UP (ref 3.8–10.5)
WBC # FLD AUTO: 10.47 K/UL — SIGNIFICANT CHANGE UP (ref 3.8–10.5)

## 2023-07-29 PROCEDURE — 96375 TX/PRO/DX INJ NEW DRUG ADDON: CPT

## 2023-07-29 PROCEDURE — G1004: CPT

## 2023-07-29 PROCEDURE — 85025 COMPLETE CBC W/AUTO DIFF WBC: CPT

## 2023-07-29 PROCEDURE — 36415 COLL VENOUS BLD VENIPUNCTURE: CPT

## 2023-07-29 PROCEDURE — 96374 THER/PROPH/DIAG INJ IV PUSH: CPT

## 2023-07-29 PROCEDURE — 80053 COMPREHEN METABOLIC PANEL: CPT

## 2023-07-29 PROCEDURE — 74176 CT ABD & PELVIS W/O CONTRAST: CPT | Mod: 26,ME

## 2023-07-29 PROCEDURE — 99284 EMERGENCY DEPT VISIT MOD MDM: CPT

## 2023-07-29 PROCEDURE — 74176 CT ABD & PELVIS W/O CONTRAST: CPT | Mod: ME

## 2023-07-29 PROCEDURE — 99284 EMERGENCY DEPT VISIT MOD MDM: CPT | Mod: 25

## 2023-07-29 RX ORDER — OXYCODONE AND ACETAMINOPHEN 5; 325 MG/1; MG/1
1 TABLET ORAL
Qty: 16 | Refills: 0
Start: 2023-07-29 | End: 2023-08-01

## 2023-07-29 RX ORDER — ONDANSETRON 8 MG/1
4 TABLET, FILM COATED ORAL ONCE
Refills: 0 | Status: COMPLETED | OUTPATIENT
Start: 2023-07-29 | End: 2023-07-29

## 2023-07-29 RX ORDER — IBUPROFEN 200 MG
600 TABLET ORAL ONCE
Refills: 0 | Status: DISCONTINUED | OUTPATIENT
Start: 2023-07-29 | End: 2023-07-29

## 2023-07-29 RX ORDER — SODIUM CHLORIDE 9 MG/ML
1000 INJECTION INTRAMUSCULAR; INTRAVENOUS; SUBCUTANEOUS ONCE
Refills: 0 | Status: COMPLETED | OUTPATIENT
Start: 2023-07-29 | End: 2023-07-29

## 2023-07-29 RX ORDER — KETOROLAC TROMETHAMINE 30 MG/ML
30 SYRINGE (ML) INJECTION ONCE
Refills: 0 | Status: DISCONTINUED | OUTPATIENT
Start: 2023-07-29 | End: 2023-07-29

## 2023-07-29 RX ORDER — MORPHINE SULFATE 50 MG/1
4 CAPSULE, EXTENDED RELEASE ORAL ONCE
Refills: 0 | Status: COMPLETED | OUTPATIENT
Start: 2023-07-29 | End: 2023-07-29

## 2023-07-29 RX ADMIN — SODIUM CHLORIDE 2000 MILLILITER(S): 9 INJECTION INTRAMUSCULAR; INTRAVENOUS; SUBCUTANEOUS at 03:43

## 2023-07-29 RX ADMIN — Medication 30 MILLIGRAM(S): at 04:20

## 2023-07-29 RX ADMIN — Medication 30 MILLIGRAM(S): at 03:42

## 2023-07-29 RX ADMIN — ONDANSETRON 4 MILLIGRAM(S): 8 TABLET, FILM COATED ORAL at 03:43

## 2023-07-29 NOTE — ED PROVIDER NOTE - NSFOLLOWUPINSTRUCTIONS_ED_ALL_ED_FT
1. Follow up with your urologist in 1-2 days  2. Drink lots of fluids  3. Take your Flomax as prescribed  4. strian urine  5. Oxycodone 5mg every 6 hours as needed for pain. Do not drive or drink alcohol no this med  6. Ibuprofen 600mg every 6 hours on a full stomach as needed for pain  7. Return to the ED for worsening pain, vomiting, fevers or any concerns

## 2023-07-29 NOTE — ED PROVIDER NOTE - PATIENT PORTAL LINK FT
You can access the FollowMyHealth Patient Portal offered by [facility] by registering at the following website: [email or web address]. By joining [facility]’s FollowMyHealth portal, you will also be able to view your health information using other applications (apps) compatible with our system.

## 2023-07-29 NOTE — ED ADULT NURSE NOTE - NS ED NURSE LEVEL OF CONSCIOUSNESS AFFECT
Called and spoke with patient informing that Dr. Hernández is still in surgery this morning, and will not be able to make today's morning appt. Requested patient schedule for the afternoon. Patient stated he works in the afternoon. Patient rescheduled for Thursday 10/7/21 at 11:15AM.   
Calm

## 2023-07-29 NOTE — ED PROVIDER NOTE - OBJECTIVE STATEMENT
73 y/o male with h/o multiple kidney stones presents to ED c/o left flank pain off and on for 2 days, also c/o he has hematuria for few days. c/o pain is sharp, severe and off and off, no fever, but c/o he had few episodes of vomiting

## 2023-07-29 NOTE — ED ADULT NURSE NOTE - NSFALLUNIVINTERV_ED_ALL_ED
Bed/Stretcher in lowest position, wheels locked, appropriate side rails in place/Call bell, personal items and telephone in reach/Instruct patient to call for assistance before getting out of bed/chair/stretcher/Non-slip footwear applied when patient is off stretcher/[place] to call system/Physically safe environment - no spills, clutter or unnecessary equipment/Purposeful proactive rounding/Room/bathroom lighting operational, light cord in reach

## 2023-07-29 NOTE — ED PROVIDER NOTE - CARE PROVIDER_API CALL
Mohit Moya  Urology  [address] [place] [address], [address]  [place], [place] [id number]  Phone: [phone]  Fax: [phone]  Follow Up Time:

## 2023-07-31 NOTE — CHART NOTE - NSCHARTNOTEFT_GEN_A_CORE
SW placed call to patient to discuss and assist with follow up care.  Patient presented to ED on 7/28/23 due to flank pain.  Patient reports he contacted his urologist office this morning and is awaiting a call back from  after chart is reviewed.  Patient declined needing any other SW assistance at this time, encouraged to call ED SW if further SW assistance is needed.

## 2023-08-02 ENCOUNTER — APPOINTMENT (OUTPATIENT)
Dept: CARDIOLOGY | Facility: CLINIC | Age: 73
End: 2023-08-02

## 2023-08-02 ENCOUNTER — APPOINTMENT (OUTPATIENT)
Dept: RADIOLOGY | Facility: HOSPITAL | Age: 73
End: 2023-08-02

## 2023-08-02 ENCOUNTER — OUTPATIENT (OUTPATIENT)
Dept: OUTPATIENT SERVICES | Facility: HOSPITAL | Age: 73
LOS: 1 days | End: 2023-08-02
Payer: MEDICARE

## 2023-08-02 DIAGNOSIS — Z90.89 ACQUIRED ABSENCE OF OTHER ORGANS: Chronic | ICD-10-CM

## 2023-08-02 DIAGNOSIS — Z00.8 ENCOUNTER FOR OTHER GENERAL EXAMINATION: ICD-10-CM

## 2023-08-02 DIAGNOSIS — Z98.890 OTHER SPECIFIED POSTPROCEDURAL STATES: Chronic | ICD-10-CM

## 2023-08-02 PROCEDURE — 74018 RADEX ABDOMEN 1 VIEW: CPT | Mod: 26

## 2023-08-02 PROCEDURE — 74018 RADEX ABDOMEN 1 VIEW: CPT

## 2023-08-08 ENCOUNTER — APPOINTMENT (OUTPATIENT)
Dept: CARDIOLOGY | Facility: CLINIC | Age: 73
End: 2023-08-08
Payer: MEDICARE

## 2023-08-08 VITALS
DIASTOLIC BLOOD PRESSURE: 64 MMHG | BODY MASS INDEX: 27.07 KG/M2 | SYSTOLIC BLOOD PRESSURE: 132 MMHG | HEIGHT: 78 IN | WEIGHT: 234 LBS | OXYGEN SATURATION: 98 % | HEART RATE: 68 BPM

## 2023-08-08 DIAGNOSIS — N20.0 CALCULUS OF KIDNEY: ICD-10-CM

## 2023-08-08 DIAGNOSIS — R06.00 DYSPNEA, UNSPECIFIED: ICD-10-CM

## 2023-08-08 PROCEDURE — 99214 OFFICE O/P EST MOD 30 MIN: CPT | Mod: 25

## 2023-08-08 PROCEDURE — 93000 ELECTROCARDIOGRAM COMPLETE: CPT | Mod: XU

## 2023-08-08 PROCEDURE — 93015 CV STRESS TEST SUPVJ I&R: CPT

## 2023-08-08 NOTE — REASON FOR VISIT
[Other: ____] : [unfilled] [FreeTextEntry1] : Patient presents for cardiac clearance for cystoscopy.  He has had problems with kidney stones and is also been found to have bladder stones.  He presented for stress testing as well.  On stress testing completed 8 minutes of a Darron protocol and the test was terminated after he did achieve target heart rate but had shortness of breath.  He had upsloping ST segment depressions at the peak of exercise.  His stress EKG was very similar to that in 2017 this was a similar finding to his stress test that he had in 2017.  In 2017 he underwent cardiac CTA which revealed completely normal coronary arteries.  Since that time he has had no coronary events.  He denies chest discomfort.  His shortness of breath comes he states with heavy exertion.

## 2023-08-08 NOTE — ASSESSMENT
[FreeTextEntry1] : In summary, the patient is a 72-year-old man scheduled to undergo cystoscopy.  He has a history of normal coronary arteries by CTA in 2017.  Recent echocardiogram reveals normal left ventricular systolic function with mild to moderate aortic insufficiency  Exercise stress testing of today is identical to that of 2017.  As mentioned in the test of 2017 was followed up by cardiac CTA that was entirely normal.  There is no absolute cardiac contraindication to this relatively low risk procedure

## 2023-08-08 NOTE — REVIEW OF SYSTEMS
[Negative] : Heme/Lymph [FreeTextEntry5] : see hpi [FreeTextEntry6] : see hpi [FreeTextEntry8] : see hpi

## 2023-08-10 ENCOUNTER — TRANSCRIPTION ENCOUNTER (OUTPATIENT)
Age: 73
End: 2023-08-10

## 2023-08-10 RX ORDER — SODIUM CHLORIDE 9 MG/ML
1000 INJECTION, SOLUTION INTRAVENOUS
Refills: 0 | Status: DISCONTINUED | OUTPATIENT
Start: 2023-08-11 | End: 2023-08-25

## 2023-08-11 ENCOUNTER — APPOINTMENT (OUTPATIENT)
Dept: RADIOLOGY | Facility: HOSPITAL | Age: 73
End: 2023-08-11

## 2023-08-11 ENCOUNTER — APPOINTMENT (OUTPATIENT)
Dept: ULTRASOUND IMAGING | Facility: HOSPITAL | Age: 73
End: 2023-08-11

## 2023-08-11 ENCOUNTER — TRANSCRIPTION ENCOUNTER (OUTPATIENT)
Age: 73
End: 2023-08-11

## 2023-08-11 ENCOUNTER — OUTPATIENT (OUTPATIENT)
Dept: OUTPATIENT SERVICES | Facility: HOSPITAL | Age: 73
LOS: 1 days | End: 2023-08-11
Payer: MEDICARE

## 2023-08-11 VITALS
DIASTOLIC BLOOD PRESSURE: 78 MMHG | SYSTOLIC BLOOD PRESSURE: 142 MMHG | TEMPERATURE: 98 F | HEART RATE: 77 BPM | RESPIRATION RATE: 15 BRPM | OXYGEN SATURATION: 99 % | HEIGHT: 78 IN | WEIGHT: 235.01 LBS

## 2023-08-11 VITALS
DIASTOLIC BLOOD PRESSURE: 78 MMHG | SYSTOLIC BLOOD PRESSURE: 147 MMHG | OXYGEN SATURATION: 97 % | HEART RATE: 85 BPM | RESPIRATION RATE: 16 BRPM | TEMPERATURE: 97 F

## 2023-08-11 DIAGNOSIS — Z98.890 OTHER SPECIFIED POSTPROCEDURAL STATES: Chronic | ICD-10-CM

## 2023-08-11 DIAGNOSIS — N21.0 CALCULUS IN BLADDER: ICD-10-CM

## 2023-08-11 DIAGNOSIS — Z90.89 ACQUIRED ABSENCE OF OTHER ORGANS: Chronic | ICD-10-CM

## 2023-08-11 PROCEDURE — 88300 SURGICAL PATH GROSS: CPT | Mod: 26

## 2023-08-11 DEVICE — GUIDEWIRE SENSOR NITINOL STRAIGHT .038" X 150CM: Type: IMPLANTABLE DEVICE | Site: LEFT | Status: FUNCTIONAL

## 2023-08-11 DEVICE — LASER FIBER SINGLEFLEX HOLMIUM 270: Type: IMPLANTABLE DEVICE | Site: LEFT | Status: FUNCTIONAL

## 2023-08-11 DEVICE — URETERAL CATH DUAL LUMEN 10FR 54CM: Type: IMPLANTABLE DEVICE | Site: LEFT | Status: FUNCTIONAL

## 2023-08-11 DEVICE — URETERAL CATH FLEXIMA OPEN END 5FR 70CM: Type: IMPLANTABLE DEVICE | Site: LEFT | Status: FUNCTIONAL

## 2023-08-11 DEVICE — URETERAL STENT TRIA SOFT 6FR 26MM: Type: IMPLANTABLE DEVICE | Site: LEFT | Status: FUNCTIONAL

## 2023-08-11 DEVICE — LASER FIBER HOLMIUM 1000: Type: IMPLANTABLE DEVICE | Site: LEFT | Status: FUNCTIONAL

## 2023-08-11 DEVICE — URETEROSCOPE LITHOVUE DISP: Type: IMPLANTABLE DEVICE | Site: LEFT | Status: FUNCTIONAL

## 2023-08-11 RX ORDER — SODIUM CHLORIDE 9 MG/ML
1000 INJECTION, SOLUTION INTRAVENOUS
Refills: 0 | Status: DISCONTINUED | OUTPATIENT
Start: 2023-08-11 | End: 2023-08-11

## 2023-08-11 RX ORDER — ONDANSETRON 8 MG/1
4 TABLET, FILM COATED ORAL ONCE
Refills: 0 | Status: DISCONTINUED | OUTPATIENT
Start: 2023-08-11 | End: 2023-08-11

## 2023-08-11 RX ORDER — HYDROMORPHONE HYDROCHLORIDE 2 MG/ML
0.5 INJECTION INTRAMUSCULAR; INTRAVENOUS; SUBCUTANEOUS
Refills: 0 | Status: DISCONTINUED | OUTPATIENT
Start: 2023-08-11 | End: 2023-08-11

## 2023-08-11 RX ORDER — ASCORBIC ACID 60 MG
1 TABLET,CHEWABLE ORAL
Qty: 0 | Refills: 0 | DISCHARGE

## 2023-08-11 RX ORDER — HYDROMORPHONE HYDROCHLORIDE 2 MG/ML
1 INJECTION INTRAMUSCULAR; INTRAVENOUS; SUBCUTANEOUS
Refills: 0 | Status: DISCONTINUED | OUTPATIENT
Start: 2023-08-11 | End: 2023-08-11

## 2023-08-11 RX ORDER — FINASTERIDE 5 MG/1
1 TABLET, FILM COATED ORAL
Qty: 0 | Refills: 0 | DISCHARGE

## 2023-08-11 RX ORDER — FAMOTIDINE 10 MG/ML
1 INJECTION INTRAVENOUS
Refills: 0 | DISCHARGE

## 2023-08-11 RX ADMIN — SODIUM CHLORIDE 50 MILLILITER(S): 9 INJECTION, SOLUTION INTRAVENOUS at 11:56

## 2023-08-11 NOTE — BRIEF OPERATIVE NOTE - NSICDXBRIEFPROCEDURE_GEN_ALL_CORE_FT
PROCEDURES:  Ureteroscopy with laser lithotripsy and stent placement 11-Aug-2023 14:55:13  Mohit Moya  Complex litholapaxy of calculus of bladder using laser 11-Aug-2023 14:55:35  Mohit Moya

## 2023-08-11 NOTE — ASU DISCHARGE PLAN (ADULT/PEDIATRIC) - ASU DC SPECIAL INSTRUCTIONSFT
Drink inc fluids  Expect blood in urine and around carbone  Call Monday AM to come to office Monday AM for carbone removal [phone]

## 2023-08-11 NOTE — BRIEF OPERATIVE NOTE - NSICDXBRIEFPREOP_GEN_ALL_CORE_FT
PRE-OP DIAGNOSIS:  Left ureteral stone 11-Aug-2023 14:55:47  Mohit Moya  Bladder stone 11-Aug-2023 14:55:54  Mohit Moya

## 2023-08-11 NOTE — ASU PATIENT PROFILE, ADULT - PATIENT KNOW
"[facility] ATHLETIC MEDICINE  [facility]   Sport Psychology Progress Note      Location of Visit: [facility]  Date of Visit: 12/1/2022  Duration of Session: 45-50 minutes  Leland Gonzáles presented on time for initial telehealth/videoconference. Leland Gonzáles was located at the following address for the appointment: [address] [place] [id number]    Suicide Assessment:  Client denies current urges to self-harm, suicidal ideation, homicidal ideation, plan, intent, or means.    Mental Status & Observations:  Leland appeared generally alert and oriented. Dress was appropriate to the weather and occasion. Grooming and hygiene were appropriate. Eye contact was good. Speech was of normal volume and normal. Mood was appropriate with congruent affect. Thought processes were relevant, logical and goal-directed. Thought content was within normal limits with no evidence of psychotic or paranoid features. Memory appeared intact. Insight and judgment appeared age appropriate with good focus in session.  She exhibited normal motor activity during the appointment.  Behavior was cooperative.      Observations and response to counseling:  Client was open, attentive, and engaged throughout. Client reported stress with sense of enjoyment with sport and difficulty \"wanting to swim\" lately.    Intervention:  Client and provider processed yesterday's phone call regarding her sense of panic. Client reported she was initially frustrated when she told her dad she would not swim the evening and stated he was not supportive and was accusing her of not being \"strong enough.\" Her emotions and frustrations with the situation were validated and acknowledged. The client stated this dynamic has been \"like this with dad since I started swimming\" but then after a couple hours he cools down. Time was spent processing client's sense of disconnection from swimming. She stated, \"I tried to have " "fun with friends at meet but am still sad.  She stated she was also upset initially with [employer] results even thought they were her best times, stating she did not hit the goals she initially had but looks back and is happy with her results.    Time was spent revisiting themes of \"doing everything and planning\" is around swimming (e.g. homework, sleep schedule, nutrition\" and part is due to fear of letting her self and others down. Provider shared with the client it seems she tends to \"do what others tell her to do\" rather than processing what she wants to do. Although client stated she would like a break from swimming, she stated it would be harder to get back into swim shape and also does not want to regret the season. Client and provider spent time validating her current sense of conflict while also focusing on ways the client can identify \"what does she want.\"    Goals for counseling:  Performance anxiety skills; mindfulness; present-moment strategies, process motivation for sport    Therapy objectives/goals:  Decrease perceived stress  Increase assertiveness  Improve sport performance    Therapy follow-up plan:  Individual counseling sessions as needed      Janes Mix, PhD LP  " yes

## 2023-08-11 NOTE — H&P ADULT - NSHPPHYSICALEXAM_GEN_ALL_CORE
T(C): 36.8 (08-11-23 @ 12:00), Max: 36.8 (08-11-23 @ 11:12)  HR: 77 (08-11-23 @ 12:00) (77 - 77)  BP: 142/78 (08-11-23 @ 12:00) (142/78 - 142/78)  RR: 15 (08-11-23 @ 12:00) (15 - 15)  SpO2: 99% (08-11-23 @ 12:00) (99% - 99%)    CONSTITUTIONAL: Well groomed, no apparent distress  EYES: PERRLA and symmetric, EOMI, No conjunctival or scleral injection, non-icteric  ENMT: Oral mucosa with moist membranes. Normal dentition; no pharyngeal injection or exudates             NECK: Supple, symmetric and without tracheal deviation   RESP: No respiratory distress, no use of accessory muscles; CTA b/l, no WRR  CV: RRR, +S1S2, no MRG; no JVD; no peripheral edema  GI: Soft, NT, ND, no rebound, no guarding; no palpable masses; no hepatosplenomegaly; no hernia palpated  LYMPH: No cervical LAD or tenderness  MSK: Normal gait; No digital clubbing or cyanosis           Normal ROM without pain, no spinal tenderness, normal muscle strength/tone  SKIN: No rashes or ulcers noted; no subcutaneous nodules or induration palpable  NEURO: CN II-XII intact; normal reflexes in upper and lower extremities, sensation intact in upper and lower extremities b/l to light touch   PSYCH: Appropriate insight/judgment; A+O x 3, mood and affect appropriate, recent/remote memory intact    as per PCP clearance by Dr. Brandon Borja

## 2023-08-11 NOTE — ASU PATIENT PROFILE, ADULT - NSICDXPASTSURGICALHX_GEN_ALL_CORE_FT
PAST SURGICAL HISTORY:  H/O lithotripsy x4    H/O umbilical hernia repair June 2023    H/O varicose vein ligation     History of prostate surgery     S/P colonoscopy 2018, no polyps    S/P cystoscopy 2015 bladder stone extraction    S/P T&A (status post tonsillectomy and adenoidectomy) as child

## 2023-08-11 NOTE — ASU DISCHARGE PLAN (ADULT/PEDIATRIC) - NS MD DC FALL RISK RISK
For information on Fall & Injury Prevention, visit: [email or web address] OR  [email or web address] OR  https://www.cdc.gov/steadi/patient.html

## 2023-08-11 NOTE — BRIEF OPERATIVE NOTE - NSICDXBRIEFPOSTOP_GEN_ALL_CORE_FT
POST-OP DIAGNOSIS:  Left ureteral stone 11-Aug-2023 14:56:04  Mohit Moya  Bladder stone 11-Aug-2023 14:56:11  Mohit Moya

## 2023-08-11 NOTE — ASU DISCHARGE PLAN (ADULT/PEDIATRIC) - OK TO LEAVE MESSAGE ON VOICEMAIL
Yes Bilateral Helical Rim Advancement Flap Text: The defect edges were debeveled with a #15 blade scalpel.  Given the location of the defect and the proximity to free margins (helical rim) a bilateral helical rim advancement flap was deemed most appropriate.  Using a sterile surgical marker, the appropriate advancement flaps were drawn incorporating the defect and placing the expected incisions between the helical rim and antihelix where possible.  The area thus outlined was incised through and through with a #15 scalpel blade.  With a skin hook and iris scissors, the flaps were gently and sharply undermined and freed up.

## 2023-08-11 NOTE — ASU DISCHARGE PLAN (ADULT/PEDIATRIC) - NURSING INSTRUCTIONS
All discharge information reviewed with patient including pain, safety, medication and follow up care . Pt acknowledges understanding of discharge instructions. Pt instructed on [place] care

## 2023-08-11 NOTE — BRIEF OPERATIVE NOTE - OPERATION/FINDINGS
+trilobar BPH with trabeculated bladder    oval bladder stones    large left upper ureteral stone with hydronephrosis

## 2023-08-11 NOTE — H&P ADULT - ASSESSMENT
71 yo male scheduled for cystolitholapaxy, left flexible ureteroscopy and laser lithotripsy     pt cleared from medical and cardiac standpoint    clearances on chart

## 2023-08-16 ENCOUNTER — APPOINTMENT (OUTPATIENT)
Dept: CARDIOLOGY | Facility: CLINIC | Age: 73
End: 2023-08-16

## 2023-08-16 LAB — SURGICAL PATHOLOGY STUDY: SIGNIFICANT CHANGE UP

## 2023-08-16 PROCEDURE — 52317 REMOVE BLADDER STONE: CPT

## 2023-08-16 PROCEDURE — C1758: CPT

## 2023-08-16 PROCEDURE — C1889: CPT

## 2023-08-16 PROCEDURE — 82365 CALCULUS SPECTROSCOPY: CPT

## 2023-08-16 PROCEDURE — C1769: CPT

## 2023-08-16 PROCEDURE — 76000 FLUOROSCOPY <1 HR PHYS/QHP: CPT

## 2023-08-16 PROCEDURE — C2617: CPT

## 2023-08-16 PROCEDURE — C1747: CPT

## 2023-08-16 PROCEDURE — 52356 CYSTO/URETERO W/LITHOTRIPSY: CPT | Mod: LT

## 2023-08-16 PROCEDURE — 88300 SURGICAL PATH GROSS: CPT

## 2023-08-21 NOTE — H&P PST ADULT - NS PRO OT REFERRAL QUES 2 YN
[de-identified] : 36 year old male is s/p right distal biceps tendon repair 12/06/22 here for follow up of right shoulder pain. As per patient symptoms have not changes since last visit. MRI of the right shoulder dated 7/31/2023 shows evidence of a small interstitial tear of the rotator cuff. Denies pain/or any loss of function in the elbow. He is not taking pain medication.  no

## 2023-08-23 LAB
CELL MATERIAL STONE EST-MCNT: SIGNIFICANT CHANGE UP
LABORATORY COMMENT REPORT: SIGNIFICANT CHANGE UP
NIDUS STONE QN: SIGNIFICANT CHANGE UP

## 2023-09-18 ENCOUNTER — OUTPATIENT (OUTPATIENT)
Dept: OUTPATIENT SERVICES | Facility: HOSPITAL | Age: 73
LOS: 1 days | End: 2023-09-18
Payer: MEDICARE

## 2023-09-18 ENCOUNTER — APPOINTMENT (OUTPATIENT)
Dept: ULTRASOUND IMAGING | Facility: HOSPITAL | Age: 73
End: 2023-09-18
Payer: MEDICARE

## 2023-09-18 ENCOUNTER — APPOINTMENT (OUTPATIENT)
Dept: RADIOLOGY | Facility: HOSPITAL | Age: 73
End: 2023-09-18
Payer: MEDICARE

## 2023-09-18 DIAGNOSIS — Z98.890 OTHER SPECIFIED POSTPROCEDURAL STATES: Chronic | ICD-10-CM

## 2023-09-18 DIAGNOSIS — Z90.89 ACQUIRED ABSENCE OF OTHER ORGANS: Chronic | ICD-10-CM

## 2023-09-18 DIAGNOSIS — Z00.8 ENCOUNTER FOR OTHER GENERAL EXAMINATION: ICD-10-CM

## 2023-09-18 PROCEDURE — 76770 US EXAM ABDO BACK WALL COMP: CPT

## 2023-09-18 PROCEDURE — 74018 RADEX ABDOMEN 1 VIEW: CPT

## 2023-09-18 PROCEDURE — 76770 US EXAM ABDO BACK WALL COMP: CPT | Mod: 26

## 2023-09-18 PROCEDURE — 74018 RADEX ABDOMEN 1 VIEW: CPT | Mod: 26

## 2023-09-29 ENCOUNTER — EMERGENCY (EMERGENCY)
Facility: HOSPITAL | Age: 73
LOS: 1 days | Discharge: HOME | End: 2023-09-29
Attending: EMERGENCY MEDICINE | Admitting: EMERGENCY MEDICINE
Payer: MEDICARE

## 2023-09-29 VITALS
RESPIRATION RATE: 18 BRPM | OXYGEN SATURATION: 95 % | SYSTOLIC BLOOD PRESSURE: 129 MMHG | HEIGHT: 76 IN | HEART RATE: 95 BPM | DIASTOLIC BLOOD PRESSURE: 83 MMHG | TEMPERATURE: 99 F | WEIGHT: 231.93 LBS

## 2023-09-29 VITALS
OXYGEN SATURATION: 98 % | SYSTOLIC BLOOD PRESSURE: 132 MMHG | HEART RATE: 98 BPM | DIASTOLIC BLOOD PRESSURE: 68 MMHG | RESPIRATION RATE: 20 BRPM

## 2023-09-29 DIAGNOSIS — Z98.890 OTHER SPECIFIED POSTPROCEDURAL STATES: Chronic | ICD-10-CM

## 2023-09-29 DIAGNOSIS — Z90.89 ACQUIRED ABSENCE OF OTHER ORGANS: Chronic | ICD-10-CM

## 2023-09-29 LAB
ALBUMIN SERPL ELPH-MCNC: 3.5 G/DL — SIGNIFICANT CHANGE UP (ref 3.3–5)
ALP SERPL-CCNC: 72 U/L — SIGNIFICANT CHANGE UP (ref 40–120)
ALT FLD-CCNC: 20 U/L — SIGNIFICANT CHANGE UP (ref 10–45)
ANION GAP SERPL CALC-SCNC: 11 MMOL/L — SIGNIFICANT CHANGE UP (ref 5–17)
APPEARANCE UR: CLEAR — SIGNIFICANT CHANGE UP
AST SERPL-CCNC: 17 U/L — SIGNIFICANT CHANGE UP (ref 10–40)
BACTERIA # UR AUTO: NEGATIVE /HPF — SIGNIFICANT CHANGE UP
BASOPHILS # BLD AUTO: 0.04 K/UL — SIGNIFICANT CHANGE UP (ref 0–0.2)
BASOPHILS NFR BLD AUTO: 0.3 % — SIGNIFICANT CHANGE UP (ref 0–2)
BILIRUB SERPL-MCNC: 0.5 MG/DL — SIGNIFICANT CHANGE UP (ref 0.2–1.2)
BILIRUB UR-MCNC: NEGATIVE — SIGNIFICANT CHANGE UP
BUN SERPL-MCNC: 25 MG/DL — HIGH (ref 7–23)
CALCIUM SERPL-MCNC: 10 MG/DL — SIGNIFICANT CHANGE UP (ref 8.4–10.5)
CHLORIDE SERPL-SCNC: 106 MMOL/L — SIGNIFICANT CHANGE UP (ref 96–108)
CO2 SERPL-SCNC: 23 MMOL/L — SIGNIFICANT CHANGE UP (ref 22–31)
COLOR SPEC: YELLOW — SIGNIFICANT CHANGE UP
CREAT SERPL-MCNC: 1.49 MG/DL — HIGH (ref 0.5–1.3)
DIFF PNL FLD: ABNORMAL
EGFR: 50 ML/MIN/1.73M2 — LOW
EOSINOPHIL # BLD AUTO: 0.02 K/UL — SIGNIFICANT CHANGE UP (ref 0–0.5)
EOSINOPHIL NFR BLD AUTO: 0.2 % — SIGNIFICANT CHANGE UP (ref 0–6)
EPI CELLS # UR: SIGNIFICANT CHANGE UP
GLUCOSE SERPL-MCNC: 139 MG/DL — HIGH (ref 70–99)
GLUCOSE UR QL: NEGATIVE MG/DL — SIGNIFICANT CHANGE UP
HCT VFR BLD CALC: 44.8 % — SIGNIFICANT CHANGE UP (ref 39–50)
HGB BLD-MCNC: 15.2 G/DL — SIGNIFICANT CHANGE UP (ref 13–17)
IMM GRANULOCYTES NFR BLD AUTO: 0.7 % — SIGNIFICANT CHANGE UP (ref 0–0.9)
KETONES UR-MCNC: 15 MG/DL
LEUKOCYTE ESTERASE UR-ACNC: ABNORMAL
LIDOCAIN IGE QN: 39 U/L — SIGNIFICANT CHANGE UP (ref 16–77)
LYMPHOCYTES # BLD AUTO: 1 K/UL — SIGNIFICANT CHANGE UP (ref 1–3.3)
LYMPHOCYTES # BLD AUTO: 7.9 % — LOW (ref 13–44)
MCHC RBC-ENTMCNC: 31.5 PG — SIGNIFICANT CHANGE UP (ref 27–34)
MCHC RBC-ENTMCNC: 33.9 GM/DL — SIGNIFICANT CHANGE UP (ref 32–36)
MCV RBC AUTO: 92.8 FL — SIGNIFICANT CHANGE UP (ref 80–100)
MONOCYTES # BLD AUTO: 0.6 K/UL — SIGNIFICANT CHANGE UP (ref 0–0.9)
MONOCYTES NFR BLD AUTO: 4.8 % — SIGNIFICANT CHANGE UP (ref 2–14)
NEUTROPHILS # BLD AUTO: 10.87 K/UL — HIGH (ref 1.8–7.4)
NEUTROPHILS NFR BLD AUTO: 86.1 % — HIGH (ref 43–77)
NITRITE UR-MCNC: NEGATIVE — SIGNIFICANT CHANGE UP
NRBC # BLD: 0 /100 WBCS — SIGNIFICANT CHANGE UP (ref 0–0)
PH UR: 7.5 — SIGNIFICANT CHANGE UP (ref 5–8)
PLATELET # BLD AUTO: 277 K/UL — SIGNIFICANT CHANGE UP (ref 150–400)
POTASSIUM SERPL-MCNC: 4.1 MMOL/L — SIGNIFICANT CHANGE UP (ref 3.5–5.3)
POTASSIUM SERPL-SCNC: 4.1 MMOL/L — SIGNIFICANT CHANGE UP (ref 3.5–5.3)
PROT SERPL-MCNC: 6.9 G/DL — SIGNIFICANT CHANGE UP (ref 6–8.3)
PROT UR-MCNC: NEGATIVE MG/DL — SIGNIFICANT CHANGE UP
RBC # BLD: 4.83 M/UL — SIGNIFICANT CHANGE UP (ref 4.2–5.8)
RBC # FLD: 12.7 % — SIGNIFICANT CHANGE UP (ref 10.3–14.5)
RBC CASTS # UR COMP ASSIST: SIGNIFICANT CHANGE UP /HPF (ref 0–4)
SODIUM SERPL-SCNC: 140 MMOL/L — SIGNIFICANT CHANGE UP (ref 135–145)
SP GR SPEC: 1.02 — SIGNIFICANT CHANGE UP (ref 1–1.03)
UROBILINOGEN FLD QL: 0.2 MG/DL — SIGNIFICANT CHANGE UP (ref 0.2–1)
WBC # BLD: 12.62 K/UL — HIGH (ref 3.8–10.5)
WBC # FLD AUTO: 12.62 K/UL — HIGH (ref 3.8–10.5)
WBC UR QL: SIGNIFICANT CHANGE UP /HPF (ref 0–5)

## 2023-09-29 PROCEDURE — 81001 URINALYSIS AUTO W/SCOPE: CPT

## 2023-09-29 PROCEDURE — 83690 ASSAY OF LIPASE: CPT

## 2023-09-29 PROCEDURE — 96374 THER/PROPH/DIAG INJ IV PUSH: CPT

## 2023-09-29 PROCEDURE — 99284 EMERGENCY DEPT VISIT MOD MDM: CPT

## 2023-09-29 PROCEDURE — 85025 COMPLETE CBC W/AUTO DIFF WBC: CPT

## 2023-09-29 PROCEDURE — 80053 COMPREHEN METABOLIC PANEL: CPT

## 2023-09-29 PROCEDURE — 87086 URINE CULTURE/COLONY COUNT: CPT

## 2023-09-29 PROCEDURE — 74018 RADEX ABDOMEN 1 VIEW: CPT | Mod: 26

## 2023-09-29 PROCEDURE — 99284 EMERGENCY DEPT VISIT MOD MDM: CPT | Mod: 25

## 2023-09-29 PROCEDURE — 96361 HYDRATE IV INFUSION ADD-ON: CPT

## 2023-09-29 PROCEDURE — 36415 COLL VENOUS BLD VENIPUNCTURE: CPT

## 2023-09-29 PROCEDURE — 87077 CULTURE AEROBIC IDENTIFY: CPT

## 2023-09-29 PROCEDURE — 74018 RADEX ABDOMEN 1 VIEW: CPT

## 2023-09-29 RX ORDER — SODIUM CHLORIDE 9 MG/ML
1000 INJECTION INTRAMUSCULAR; INTRAVENOUS; SUBCUTANEOUS ONCE
Refills: 0 | Status: COMPLETED | OUTPATIENT
Start: 2023-09-29 | End: 2023-09-29

## 2023-09-29 RX ORDER — KETOROLAC TROMETHAMINE 30 MG/ML
15 SYRINGE (ML) INJECTION ONCE
Refills: 0 | Status: DISCONTINUED | OUTPATIENT
Start: 2023-09-29 | End: 2023-09-29

## 2023-09-29 RX ADMIN — Medication 15 MILLIGRAM(S): at 01:22

## 2023-09-29 RX ADMIN — Medication 15 MILLIGRAM(S): at 01:52

## 2023-09-29 RX ADMIN — SODIUM CHLORIDE 1000 MILLILITER(S): 9 INJECTION INTRAMUSCULAR; INTRAVENOUS; SUBCUTANEOUS at 02:21

## 2023-09-29 RX ADMIN — SODIUM CHLORIDE 1000 MILLILITER(S): 9 INJECTION INTRAMUSCULAR; INTRAVENOUS; SUBCUTANEOUS at 01:22

## 2023-09-29 NOTE — ED PROVIDER NOTE - CLINICAL SUMMARY MEDICAL DECISION MAKING FREE TEXT BOX
pt with R renal colic pt with R renal colic, feeling much better after dose of toradol and fluids, repeat KUB does not show much movement of the stone.

## 2023-09-29 NOTE — ED PROVIDER NOTE - CARE PROVIDERS DIRECT ADDRESSES
[email or web address]

## 2023-09-29 NOTE — ED ADULT NURSE NOTE - NSFALLUNIVINTERV_ED_ALL_ED
Bed/Stretcher in lowest position, wheels locked, appropriate side rails in place/Call bell, personal items and telephone in reach/Instruct patient to call for assistance before getting out of bed/chair/stretcher/Non-slip footwear applied when patient is off stretcher/[place] to call system/Physically safe environment - no spills, clutter or unnecessary equipment/Purposeful proactive rounding/Room/bathroom lighting operational, light cord in reach

## 2023-09-29 NOTE — ED PROVIDER NOTE - NSFOLLOWUPINSTRUCTIONS_ED_ALL_ED_FT
-- You should update your primary care physician on your Emergency Department visit and follow up with them.  If you do not have a physician or have difficulty following up, please call: [phone] to obtain a [facility] doctor or specialist who can provide follow up.    -- Return to the ER for worsening or persistent symptoms, and/or ANY NEW OR CONCERNING SYMPTOMS. -- You should update your primary care physician on your Emergency Department visit and follow up with them.  If you do not have a physician or have difficulty following up, please call: [phone] to obtain a [facility] doctor or specialist who can provide follow up.    -- Stay hydrated.    -- Take ibuprofen 600 mg every 6-8 hours with food.  You can take your oxycodone in addition to the ibuprofen and at the same time.    -- Return to the ER for worsening or persistent symptoms, and/or ANY NEW OR CONCERNING SYMPTOMS.

## 2023-09-29 NOTE — ED PROVIDER NOTE - OBJECTIVE STATEMENT
pt within known R ureteral stone approximately 1cm in size, waiting for it to pass, followed by Dr. Moya (urology), comes in today bc R flank pain has been much worse than normal, pt took 1 oxycodone at home and then another a few hours later which did not help much so pt comes to ER for further pain mgmt. denies any fevers or vomiting. pt states the pain feels the same is his normal kidney stone pain.

## 2023-09-29 NOTE — ED ADULT TRIAGE NOTE - CHIEF COMPLAINT QUOTE
c/o right flank pain for confirmed kidney stone. Pt. is f/iu with urology. Pt. states pain started ~4pm yesterday afternoon. Pt. took rx. pain medication and tylenol with no relief. Pt. passed one stone but had two in imaging per Urology.

## 2023-09-29 NOTE — ED PROVIDER NOTE - PATIENT PORTAL LINK FT
You can access the FollowMyHealth Patient Portal offered by [facility] by registering at the following website: [email or web address]. By joining [employer]’s FollowMyHealth portal, you will also be able to view your health information using other applications (apps) compatible with our system.

## 2023-09-29 NOTE — ED PROVIDER NOTE - CARE PROVIDER_API CALL
Mohit Moya  Urology  [address] [place] [address], [address]  [place], [place] [id number]  Phone: [phone]  Fax: [phone]  Follow Up Time:

## 2023-10-01 LAB
CULTURE RESULTS: SIGNIFICANT CHANGE UP
SPECIMEN SOURCE: SIGNIFICANT CHANGE UP

## 2023-10-03 NOTE — CHART NOTE - NSCHARTNOTEFT_GEN_A_CORE
SW placed call to patient to discuss and assist with follow up care.  Patient presented to ED on 9/29/23 for flank pain.  SW unable to reach patient at this time, VM left requesting call back.

## 2023-10-05 ENCOUNTER — APPOINTMENT (OUTPATIENT)
Dept: NEPHROLOGY | Facility: CLINIC | Age: 73
End: 2023-10-05

## 2023-10-11 NOTE — PHYSICAL EXAM
[General Appearance - Alert] : alert [General Appearance - In No Acute Distress] : in no acute distress [General Appearance - Well Nourished] : well nourished [General Appearance - Well Developed] : well developed [General Appearance - Well-Appearing] : healthy appearing [Extraocular Movements] : extraocular movements were intact [Outer Ear] : the ears and nose were normal in appearance [Neck Appearance] : the appearance of the neck was normal [] : the neck was supple [Auscultation Breath Sounds / Voice Sounds] : lungs were clear to auscultation bilaterally [Heart Sounds] : normal S1 and S2 [Arterial Pulses Carotid] : carotid pulses were normal with no bruits [Bowel Sounds] : normal bowel sounds [Abdomen Soft] : soft [No CVA Tenderness] : no ~M costovertebral angle tenderness [Abnormal Walk] : normal gait [Skin Color & Pigmentation] : normal skin color and pigmentation [Sensation] : the sensory exam was normal to light touch and pinprick [Oriented To Time, Place, And Person] : oriented to person, place, and time [Impaired Insight] : insight and judgment were intact [Affect] : the affect was normal [Mood] : the mood was normal [Memory Recent] : recent memory was not impaired [Memory Remote] : remote memory was not impaired Xray Chest 1 View- PORTABLE-Urgent

## 2023-10-26 NOTE — ED ADULT TRIAGE NOTE - HOW PATIENT ADDRESSED, PROFILE
Yogesh Minocycline Counseling: Patient advised regarding possible photosensitivity and discoloration of the teeth, skin, lips, tongue and gums.  Patient instructed to avoid sunlight, if possible.  When exposed to sunlight, patients should wear protective clothing, sunglasses, and sunscreen.  The patient was instructed to call the office immediately if the following severe adverse effects occur:  hearing changes, easy bruising/bleeding, severe headache, or vision changes.  The patient verbalized understanding of the proper use and possible adverse effects of minocycline.  All of the patient's questions and concerns were addressed.

## 2023-11-01 NOTE — H&P PST ADULT - DOES THIS PATIENT HAVE A HISTORY OF OR HAS BEEN DX WITH HEART FAILURE?
See 10/12/23 e-advice.  Patient was to repeat PSA test on or after 11/9/23.  Order is in place.  This is a blood test, not urine.      Patient contacted and provided instructions.  He will complete on 11/10/23 when he is in the clinic for his Audiology appointment.      Nothing further at this time.     no

## 2024-01-25 ENCOUNTER — APPOINTMENT (OUTPATIENT)
Dept: MRI IMAGING | Facility: HOSPITAL | Age: 74
End: 2024-01-25
Payer: MEDICARE

## 2024-01-25 ENCOUNTER — OUTPATIENT (OUTPATIENT)
Dept: OUTPATIENT SERVICES | Facility: HOSPITAL | Age: 74
LOS: 1 days | End: 2024-01-25
Payer: MEDICARE

## 2024-01-25 DIAGNOSIS — Z98.890 OTHER SPECIFIED POSTPROCEDURAL STATES: Chronic | ICD-10-CM

## 2024-01-25 DIAGNOSIS — S46.212A STRAIN OF MUSCLE, FASCIA AND TENDON OF OTHER PARTS OF BICEPS, LEFT ARM, INITIAL ENCOUNTER: ICD-10-CM

## 2024-01-25 PROCEDURE — 73221 MRI JOINT UPR EXTREM W/O DYE: CPT

## 2024-01-25 PROCEDURE — 73221 MRI JOINT UPR EXTREM W/O DYE: CPT | Mod: 26,LT,MH

## 2024-04-12 NOTE — H&P PST ADULT - LIVES WITH, PROFILE
PA for Wegovy    Submitted via    [x]CMM-KEY [id number]  []Surescripts-Case ID #   []Faxed to plan   []Other website   []Phone call Case ID #     Office notes sent, clinical questions answered. Awaiting determination    Turnaround time for your insurance to make a decision on your Prior Authorization can take 7-21 business days.            
PA for Wegovy EXCLUDED from plan     Reason: Plan Exclusion      Message sent to office clinical pool Yes        
significant other

## 2024-06-03 ENCOUNTER — OUTPATIENT (OUTPATIENT)
Dept: OUTPATIENT SERVICES | Facility: HOSPITAL | Age: 74
LOS: 1 days | End: 2024-06-03
Payer: MEDICARE

## 2024-06-03 DIAGNOSIS — Z98.890 OTHER SPECIFIED POSTPROCEDURAL STATES: Chronic | ICD-10-CM

## 2024-06-03 DIAGNOSIS — R13.10 DYSPHAGIA, UNSPECIFIED: ICD-10-CM

## 2024-06-03 DIAGNOSIS — Z90.89 ACQUIRED ABSENCE OF OTHER ORGANS: Chronic | ICD-10-CM

## 2024-06-03 PROCEDURE — 74220 X-RAY XM ESOPHAGUS 1CNTRST: CPT

## 2024-06-03 PROCEDURE — 74220 X-RAY XM ESOPHAGUS 1CNTRST: CPT | Mod: 26

## 2024-08-29 ENCOUNTER — APPOINTMENT (OUTPATIENT)
Dept: ULTRASOUND IMAGING | Facility: HOSPITAL | Age: 74
End: 2024-08-29
Payer: MEDICARE

## 2024-08-29 ENCOUNTER — OUTPATIENT (OUTPATIENT)
Dept: OUTPATIENT SERVICES | Facility: HOSPITAL | Age: 74
LOS: 1 days | End: 2024-08-29
Payer: MEDICARE

## 2024-08-29 DIAGNOSIS — Z98.890 OTHER SPECIFIED POSTPROCEDURAL STATES: Chronic | ICD-10-CM

## 2024-08-29 DIAGNOSIS — Z00.8 ENCOUNTER FOR OTHER GENERAL EXAMINATION: ICD-10-CM

## 2024-08-29 PROCEDURE — 76775 US EXAM ABDO BACK WALL LIM: CPT | Mod: 26

## 2024-08-29 PROCEDURE — 76775 US EXAM ABDO BACK WALL LIM: CPT

## 2024-09-16 ENCOUNTER — NON-APPOINTMENT (OUTPATIENT)
Age: 74
End: 2024-09-16

## 2024-09-16 DIAGNOSIS — Z85.828 PERSONAL HISTORY OF OTHER MALIGNANT NEOPLASM OF SKIN: ICD-10-CM

## 2024-09-16 DIAGNOSIS — L20.9 ATOPIC DERMATITIS, UNSPECIFIED: ICD-10-CM

## 2024-09-16 DIAGNOSIS — Q82.5 CONGENITAL NON-NEOPLASTIC NEVUS: ICD-10-CM

## 2024-09-16 DIAGNOSIS — L57.0 ACTINIC KERATOSIS: ICD-10-CM

## 2024-09-16 DIAGNOSIS — L21.9 SEBORRHEIC DERMATITIS, UNSPECIFIED: ICD-10-CM

## 2024-09-16 RX ORDER — TAMSULOSIN HYDROCHLORIDE 0.4 MG/1
0.4 CAPSULE ORAL
Refills: 0 | Status: ACTIVE | COMMUNITY

## 2024-09-19 ENCOUNTER — APPOINTMENT (OUTPATIENT)
Dept: DERMATOLOGY | Facility: CLINIC | Age: 74
End: 2024-09-19
Payer: MEDICARE

## 2024-09-19 DIAGNOSIS — D18.01 HEMANGIOMA OF SKIN AND SUBCUTANEOUS TISSUE: ICD-10-CM

## 2024-09-19 DIAGNOSIS — Z85.828 PERSONAL HISTORY OF OTHER MALIGNANT NEOPLASM OF SKIN: ICD-10-CM

## 2024-09-19 DIAGNOSIS — L82.1 OTHER SEBORRHEIC KERATOSIS: ICD-10-CM

## 2024-09-19 DIAGNOSIS — L81.0 POSTINFLAMMATORY HYPERPIGMENTATION: ICD-10-CM

## 2024-09-19 DIAGNOSIS — L57.8 OTHER SKIN CHANGES DUE TO CHRONIC EXPOSURE TO NONIONIZING RADIATION: ICD-10-CM

## 2024-09-19 PROCEDURE — 99203 OFFICE O/P NEW LOW 30 MIN: CPT

## 2024-09-19 PROCEDURE — 99213 OFFICE O/P EST LOW 20 MIN: CPT

## 2024-09-19 NOTE — ASSESSMENT
[FreeTextEntry1] : Alert, oriented, well pleasant.  Sun-exposed cutaneous exam. No evidence of cutaneous malignancy. Well healed scars. No evidence of recurrence. Brown, yellow papules and plaques generalized. Seborrheic keratoses. No treatment. including right chest. Actinic damage. Reviewed sun protection. Compliant. Brown light patch left lateral calf. Post inflammatory hyperpigmentation. No treatment. Erythematous papules trunk. Angioma. No treatment.  Follow up 1 year.   Reviewed physical chart.

## 2024-12-17 NOTE — ASU PREOP CHECKLIST - STERILIZATION AFFIRMATION
Patient is cooperative with care. Patient denies all psych s/s. Medication compliant. Patient is visible laying in bed during shift. Patient denies any needs at this time. Safety checks ongoing.   n/a

## 2025-01-07 ENCOUNTER — OUTPATIENT (OUTPATIENT)
Dept: OUTPATIENT SERVICES | Facility: HOSPITAL | Age: 75
LOS: 1 days | End: 2025-01-07
Payer: MEDICARE

## 2025-01-07 ENCOUNTER — APPOINTMENT (OUTPATIENT)
Dept: ULTRASOUND IMAGING | Facility: HOSPITAL | Age: 75
End: 2025-01-07
Payer: MEDICARE

## 2025-01-07 DIAGNOSIS — Z98.890 OTHER SPECIFIED POSTPROCEDURAL STATES: Chronic | ICD-10-CM

## 2025-01-07 DIAGNOSIS — Z00.8 ENCOUNTER FOR OTHER GENERAL EXAMINATION: ICD-10-CM

## 2025-01-07 DIAGNOSIS — Z90.89 ACQUIRED ABSENCE OF OTHER ORGANS: Chronic | ICD-10-CM

## 2025-01-07 PROCEDURE — 76770 US EXAM ABDO BACK WALL COMP: CPT | Mod: 26

## 2025-01-07 PROCEDURE — 76770 US EXAM ABDO BACK WALL COMP: CPT

## 2025-02-27 NOTE — ASU PATIENT PROFILE, ADULT - INTERNATIONAL TRAVEL
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 Patient was given Augmentin from PCP for possible diverticulitis but symptoms continued. Admits to fever. Denies N/V. States she recently had a colonoscopy about 3-4 weeks ago with Dr. 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 Pt works in a [employer], discussed with the pt the importance of appropriate diet with her current diagnosis, should be seen by the nutritionist.   2/27: improved, wants to go home, d/c on Augmentin 875mg 2 times a day for 14 days and repeat CT scan, should follow up with surgery can can also follow in my office   had discussion about her beer drinking and she recognizes it is an issue. She wants to quit but wants to do it gradually.  gave her resources to call for help     Plan:  continue cefepime while inpatient   continue flagyl while inpatient   when discharging (could be as soon as tonight), send home on Augmentin 875mg 2 times a day for 2 weeks   repeat CT scan in 2 sweeks   follow with surgery outpatient   pain control as needed   AC per medicine   pulmonology, heme/onc, IR, and surgery input is appreciated     Discussed plan with Dr Johnny Arredondo, DO  Chief, Infectious Disease at [facility]  Reachable via [employer] Teams or ID office: [phone]  Weekdays: After 5pm, please call [phone] for all inquiries and new consults  Weekends: Message on-call infectious disease physician via teams (see Nick)    No

## 2025-07-22 ENCOUNTER — APPOINTMENT (OUTPATIENT)
Dept: ULTRASOUND IMAGING | Facility: HOSPITAL | Age: 75
End: 2025-07-22

## 2025-07-24 ENCOUNTER — NON-APPOINTMENT (OUTPATIENT)
Age: 75
End: 2025-07-24

## 2025-07-24 ENCOUNTER — APPOINTMENT (OUTPATIENT)
Dept: NEUROLOGY | Facility: CLINIC | Age: 75
End: 2025-07-24
Payer: MEDICARE

## 2025-07-24 VITALS
WEIGHT: 234 LBS | TEMPERATURE: 98.1 F | DIASTOLIC BLOOD PRESSURE: 74 MMHG | SYSTOLIC BLOOD PRESSURE: 130 MMHG | HEART RATE: 84 BPM | OXYGEN SATURATION: 96 % | BODY MASS INDEX: 28.49 KG/M2 | HEIGHT: 76 IN

## 2025-07-24 DIAGNOSIS — H53.9 UNSPECIFIED VISUAL DISTURBANCE: ICD-10-CM

## 2025-07-24 PROCEDURE — 99204 OFFICE O/P NEW MOD 45 MIN: CPT

## 2025-07-24 PROCEDURE — G2211 COMPLEX E/M VISIT ADD ON: CPT

## 2025-07-29 ENCOUNTER — APPOINTMENT (OUTPATIENT)
Dept: MRI IMAGING | Facility: CLINIC | Age: 75
End: 2025-07-29
Payer: MEDICARE

## 2025-07-29 ENCOUNTER — OUTPATIENT (OUTPATIENT)
Dept: OUTPATIENT SERVICES | Facility: HOSPITAL | Age: 75
LOS: 1 days | End: 2025-07-29
Payer: MEDICARE

## 2025-07-29 DIAGNOSIS — H53.9 UNSPECIFIED VISUAL DISTURBANCE: ICD-10-CM

## 2025-07-29 DIAGNOSIS — Z98.890 OTHER SPECIFIED POSTPROCEDURAL STATES: Chronic | ICD-10-CM

## 2025-07-29 DIAGNOSIS — Z90.89 ACQUIRED ABSENCE OF OTHER ORGANS: Chronic | ICD-10-CM

## 2025-07-29 PROCEDURE — 70551 MRI BRAIN STEM W/O DYE: CPT | Mod: 26

## 2025-07-29 PROCEDURE — 70544 MR ANGIOGRAPHY HEAD W/O DYE: CPT | Mod: XU

## 2025-07-29 PROCEDURE — 70544 MR ANGIOGRAPHY HEAD W/O DYE: CPT | Mod: 26,XU

## 2025-07-29 PROCEDURE — 70551 MRI BRAIN STEM W/O DYE: CPT

## 2025-07-29 PROCEDURE — 70547 MR ANGIOGRAPHY NECK W/O DYE: CPT | Mod: 26

## 2025-07-29 PROCEDURE — 70547 MR ANGIOGRAPHY NECK W/O DYE: CPT

## 2025-07-30 ENCOUNTER — APPOINTMENT (OUTPATIENT)
Dept: CARDIOLOGY | Facility: CLINIC | Age: 75
End: 2025-07-30
Payer: MEDICARE

## 2025-07-30 VITALS — SYSTOLIC BLOOD PRESSURE: 130 MMHG | HEART RATE: 64 BPM | DIASTOLIC BLOOD PRESSURE: 70 MMHG

## 2025-07-30 VITALS — BODY MASS INDEX: 28.25 KG/M2 | WEIGHT: 232 LBS | HEIGHT: 76 IN | HEART RATE: 83 BPM | OXYGEN SATURATION: 97 %

## 2025-07-30 PROCEDURE — 93000 ELECTROCARDIOGRAM COMPLETE: CPT

## 2025-07-30 PROCEDURE — G2211 COMPLEX E/M VISIT ADD ON: CPT

## 2025-07-30 PROCEDURE — 99215 OFFICE O/P EST HI 40 MIN: CPT

## 2025-08-01 ENCOUNTER — APPOINTMENT (OUTPATIENT)
Dept: SURGERY | Facility: CLINIC | Age: 75
End: 2025-08-01
Payer: MEDICARE

## 2025-08-01 VITALS
TEMPERATURE: 97.2 F | SYSTOLIC BLOOD PRESSURE: 132 MMHG | BODY MASS INDEX: 28.01 KG/M2 | DIASTOLIC BLOOD PRESSURE: 76 MMHG | HEIGHT: 76 IN | WEIGHT: 230 LBS

## 2025-08-01 DIAGNOSIS — Z85.828 PERSONAL HISTORY OF OTHER MALIGNANT NEOPLASM OF SKIN: ICD-10-CM

## 2025-08-01 DIAGNOSIS — K63.5 POLYP OF COLON: ICD-10-CM

## 2025-08-01 DIAGNOSIS — K21.9 GASTRO-ESOPHAGEAL REFLUX DISEASE W/OUT ESOPHAGITIS: ICD-10-CM

## 2025-08-01 DIAGNOSIS — N20.0 CALCULUS OF KIDNEY: ICD-10-CM

## 2025-08-01 DIAGNOSIS — G45.9 TRANSIENT CEREBRAL ISCHEMIC ATTACK, UNSPECIFIED: ICD-10-CM

## 2025-08-01 DIAGNOSIS — R94.39 ABNORMAL RESULT OF OTHER CARDIOVASCULAR FUNCTION STUDY: ICD-10-CM

## 2025-08-01 DIAGNOSIS — Z12.11 ENCOUNTER FOR SCREENING FOR MALIGNANT NEOPLASM OF COLON: ICD-10-CM

## 2025-08-01 DIAGNOSIS — I74.9 TRANSIENT CEREBRAL ISCHEMIC ATTACK, UNSPECIFIED: ICD-10-CM

## 2025-08-01 DIAGNOSIS — E72.01 CYSTINURIA: ICD-10-CM

## 2025-08-01 DIAGNOSIS — Z87.440 PERSONAL HISTORY OF URINARY (TRACT) INFECTIONS: ICD-10-CM

## 2025-08-01 PROCEDURE — 99213 OFFICE O/P EST LOW 20 MIN: CPT

## 2025-08-01 RX ORDER — PEG-3350, SODIUM SULFATE, SODIUM CHLORIDE, POTASSIUM CHLORIDE, SODIUM ASCORBATE AND ASCORBIC ACID 7.5-2.691G
100 KIT ORAL
Qty: 1 | Refills: 0 | Status: ACTIVE | COMMUNITY
Start: 2025-08-01 | End: 1900-01-01

## 2025-08-04 ENCOUNTER — NON-APPOINTMENT (OUTPATIENT)
Age: 75
End: 2025-08-04

## 2025-08-11 ENCOUNTER — APPOINTMENT (OUTPATIENT)
Dept: ULTRASOUND IMAGING | Facility: HOSPITAL | Age: 75
End: 2025-08-11
Payer: MEDICARE

## 2025-08-11 ENCOUNTER — OUTPATIENT (OUTPATIENT)
Dept: OUTPATIENT SERVICES | Facility: HOSPITAL | Age: 75
LOS: 1 days | End: 2025-08-11
Payer: MEDICARE

## 2025-08-11 DIAGNOSIS — Z90.89 ACQUIRED ABSENCE OF OTHER ORGANS: Chronic | ICD-10-CM

## 2025-08-11 DIAGNOSIS — Z98.890 OTHER SPECIFIED POSTPROCEDURAL STATES: Chronic | ICD-10-CM

## 2025-08-11 DIAGNOSIS — R10.11 RIGHT UPPER QUADRANT PAIN: ICD-10-CM

## 2025-08-11 PROCEDURE — 76700 US EXAM ABDOM COMPLETE: CPT | Mod: 26

## 2025-08-11 PROCEDURE — 76700 US EXAM ABDOM COMPLETE: CPT

## 2025-08-12 ENCOUNTER — APPOINTMENT (OUTPATIENT)
Dept: CARDIOLOGY | Facility: CLINIC | Age: 75
End: 2025-08-12
Payer: MEDICARE

## 2025-08-12 PROCEDURE — 93246 EXT ECG>7D<15D RECORDING: CPT

## 2025-08-12 PROCEDURE — 93306 TTE W/DOPPLER COMPLETE: CPT

## 2025-08-22 PROCEDURE — 93244 EXT ECG>48HR<7D REV&INTERPJ: CPT

## 2025-08-26 ENCOUNTER — TRANSCRIPTION ENCOUNTER (OUTPATIENT)
Age: 75
End: 2025-08-26

## 2025-08-26 ENCOUNTER — OUTPATIENT (OUTPATIENT)
Dept: OUTPATIENT SERVICES | Facility: HOSPITAL | Age: 75
LOS: 1 days | End: 2025-08-26
Payer: MEDICARE

## 2025-08-26 ENCOUNTER — RESULT REVIEW (OUTPATIENT)
Age: 75
End: 2025-08-26

## 2025-08-26 ENCOUNTER — APPOINTMENT (OUTPATIENT)
Dept: SURGERY | Facility: HOSPITAL | Age: 75
End: 2025-08-26

## 2025-08-26 DIAGNOSIS — Z86.0100 PERSONAL HISTORY OF COLON POLYPS, UNSPECIFIED: ICD-10-CM

## 2025-08-26 DIAGNOSIS — Z12.11 ENCOUNTER FOR SCREENING FOR MALIGNANT NEOPLASM OF COLON: ICD-10-CM

## 2025-08-26 DIAGNOSIS — Z90.89 ACQUIRED ABSENCE OF OTHER ORGANS: Chronic | ICD-10-CM

## 2025-08-26 DIAGNOSIS — Z98.890 OTHER SPECIFIED POSTPROCEDURAL STATES: Chronic | ICD-10-CM

## 2025-08-26 PROCEDURE — 88305 TISSUE EXAM BY PATHOLOGIST: CPT

## 2025-08-26 PROCEDURE — 45380 COLONOSCOPY AND BIOPSY: CPT | Mod: PT

## 2025-08-26 PROCEDURE — 45380 COLONOSCOPY AND BIOPSY: CPT

## 2025-08-26 PROCEDURE — 88305 TISSUE EXAM BY PATHOLOGIST: CPT | Mod: 26

## 2025-08-26 RX ADMIN — Medication 75 MILLILITER(S): at 10:20

## 2025-08-27 LAB — SURGICAL PATHOLOGY STUDY: SIGNIFICANT CHANGE UP

## 2025-09-03 ENCOUNTER — APPOINTMENT (OUTPATIENT)
Dept: SURGERY | Facility: CLINIC | Age: 75
End: 2025-09-03
Payer: MEDICARE

## 2025-09-03 DIAGNOSIS — Z85.828 PERSONAL HISTORY OF OTHER MALIGNANT NEOPLASM OF SKIN: ICD-10-CM

## 2025-09-03 DIAGNOSIS — R94.39 ABNORMAL RESULT OF OTHER CARDIOVASCULAR FUNCTION STUDY: ICD-10-CM

## 2025-09-03 DIAGNOSIS — G45.9 TRANSIENT CEREBRAL ISCHEMIC ATTACK, UNSPECIFIED: ICD-10-CM

## 2025-09-03 DIAGNOSIS — R82.994 HYPERCALCIURIA: ICD-10-CM

## 2025-09-03 DIAGNOSIS — I74.9 TRANSIENT CEREBRAL ISCHEMIC ATTACK, UNSPECIFIED: ICD-10-CM

## 2025-09-03 DIAGNOSIS — Z86.0100 PERSONAL HISTORY OF COLON POLYPS, UNSPECIFIED: ICD-10-CM

## 2025-09-03 DIAGNOSIS — K21.9 GASTRO-ESOPHAGEAL REFLUX DISEASE W/OUT ESOPHAGITIS: ICD-10-CM

## 2025-09-03 PROCEDURE — 99214 OFFICE O/P EST MOD 30 MIN: CPT

## 2025-09-04 PROBLEM — Z85.828 HISTORY OF SQUAMOUS CELL CARCINOMA OF SKIN OF LEFT LOWER EXTREMITY: Status: RESOLVED | Noted: 2024-09-19 | Resolved: 2025-09-04

## 2025-09-04 PROBLEM — Z86.0100 HISTORY OF COLONIC POLYPS: Status: ACTIVE | Noted: 2025-09-04

## 2025-09-04 PROBLEM — Z85.828: Status: RESOLVED | Noted: 2024-09-19 | Resolved: 2025-09-04

## (undated) DEVICE — Device

## (undated) DEVICE — SUT SURGIPRO 2-0 30" V-20

## (undated) DEVICE — GLV 6.5 PROTEXIS (WHITE)

## (undated) DEVICE — SUT POLYSORB 4-0 30" C-13 UNDYED

## (undated) DEVICE — SUT POLYSORB 2 30" GS-26

## (undated) DEVICE — SPECIMEN CONTAINER PET

## (undated) DEVICE — SUT CHROMIC 2-0 60" REEL

## (undated) DEVICE — DRAPE INSTRUMENT POUCH 6.75" X 11"

## (undated) DEVICE — PROBE PNEUMATIC DISP 1.6X453MM

## (undated) DEVICE — SOL IRR BAG H2O 3000ML

## (undated) DEVICE — VENODYNE/SCD SLEEVE CALF LARGE

## (undated) DEVICE — SUT SURGIPRO 1 30" GS-21

## (undated) DEVICE — SEAL BIOPSY PORT ADJUSTABLE UP TO 9F

## (undated) DEVICE — SOL IRR POUR H2O 1000ML

## (undated) DEVICE — DRAINAGE BAG URINARY 2L

## (undated) DEVICE — DRAPE LIGHT HANDLE COVER (GREEN)

## (undated) DEVICE — POSITIONER STRAP ARMBOARD VELCRO TS-30

## (undated) DEVICE — GLV 7.5 ULTRAFREE MAX

## (undated) DEVICE — CANISTER SUCTION LID GUARD 3000CC

## (undated) DEVICE — WARMING BLANKET UPPER ADULT

## (undated) DEVICE — SOL IRR POUR H2O 1500ML

## (undated) DEVICE — ELCTR BOVIE TIP NEEDLE INSULATED 2.8" EDGE

## (undated) DEVICE — SOL IRR POUR NS 0.9% 500ML

## (undated) DEVICE — GLV 7 PROTEXIS (WHITE)

## (undated) DEVICE — LAP PAD 18 X 18"

## (undated) DEVICE — DRSG CURITY GAUZE SPONGE 4 X 4" 12-PLY

## (undated) DEVICE — GOWN LG

## (undated) DEVICE — DRAIN RESERVOIR FOR JACKSON PRATT 100CC CARDINAL

## (undated) DEVICE — VENODYNE/SCD SLEEVE CALF MEDIUM

## (undated) DEVICE — SUT SURGIPRO 0 30" GS-22

## (undated) DEVICE — PACK CYSTO

## (undated) DEVICE — SUT SURGIPRO II 2-0 30" GS-21

## (undated) DEVICE — SUT CHROMIC 2-0 30" V-20

## (undated) DEVICE — PREP CHLORAPREP HI-LITE ORANGE 26ML

## (undated) DEVICE — POSITIONER FOAM HEAD CRADLE (PINK)

## (undated) DEVICE — SOL IRR BAG NS 0.9% 3000ML

## (undated) DEVICE — TUBING CAP SET ERBEFLO CLEVERCAP HYBRID CO2 FOR OLYMPUS SCOPES AND UCR

## (undated) DEVICE — KIT ENDO PROCEDURE CUST W/VLV

## (undated) DEVICE — SUT SOFSILK 3-0 30" V-20

## (undated) DEVICE — DRAIN JACKSON PRATT 10MM FLAT FULL NO TROCAR

## (undated) DEVICE — FOLEY CATH 2-WAY 20F 5CC LATEX LUBRICATH

## (undated) DEVICE — UROVAC

## (undated) DEVICE — ELCTR BOVIE PENCIL SMOKE EVACUATION

## (undated) DEVICE — PACK MINOR

## (undated) DEVICE — SUT POLYSORB 2-0 30" V-20 UNDYED

## (undated) DEVICE — SOLIDIFIER CANN EXPRESS 3K

## (undated) DEVICE — DRSG STERISTRIPS 0.5 X 4"

## (undated) DEVICE — SUT POLYSORB 3-0 30" V-20 UNDYED

## (undated) DEVICE — STAPLER SKIN VISI-STAT 35 WIDE

## (undated) DEVICE — DRAPE DRAINAGE BAG URO CATCH II